# Patient Record
Sex: MALE | URBAN - METROPOLITAN AREA
[De-identification: names, ages, dates, MRNs, and addresses within clinical notes are randomized per-mention and may not be internally consistent; named-entity substitution may affect disease eponyms.]

---

## 2017-04-05 ENCOUNTER — OUTPATIENT (OUTPATIENT)
Dept: OUTPATIENT SERVICES | Facility: HOSPITAL | Age: 27
LOS: 1 days | Discharge: HOME | End: 2017-04-05

## 2017-04-05 DIAGNOSIS — S86.001A UNSPECIFIED INJURY OF RIGHT ACHILLES TENDON, INITIAL ENCOUNTER: Chronic | ICD-10-CM

## 2017-04-10 DIAGNOSIS — S86.011A STRAIN OF RIGHT ACHILLES TENDON, INITIAL ENCOUNTER: ICD-10-CM
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
## 2017-05-09 ENCOUNTER — INPATIENT (INPATIENT)
Facility: HOSPITAL | Age: 27
LOS: 3 days | Discharge: HOME | DRG: 857 | End: 2017-05-13
Attending: ORTHOPAEDIC SURGERY | Admitting: ORTHOPAEDIC SURGERY
Payer: COMMERCIAL

## 2017-05-09 VITALS
RESPIRATION RATE: 16 BRPM | HEIGHT: 68 IN | DIASTOLIC BLOOD PRESSURE: 74 MMHG | TEMPERATURE: 98 F | WEIGHT: 169.98 LBS | OXYGEN SATURATION: 97 % | HEART RATE: 76 BPM | SYSTOLIC BLOOD PRESSURE: 124 MMHG

## 2017-05-09 DIAGNOSIS — T81.30XA DISRUPTION OF WOUND, UNSPECIFIED, INITIAL ENCOUNTER: ICD-10-CM

## 2017-05-09 DIAGNOSIS — S86.001A UNSPECIFIED INJURY OF RIGHT ACHILLES TENDON, INITIAL ENCOUNTER: Chronic | ICD-10-CM

## 2017-05-09 LAB
ALBUMIN SERPL ELPH-MCNC: 3.8 G/DL — SIGNIFICANT CHANGE UP (ref 3.4–5)
ALP SERPL-CCNC: 101 U/L — SIGNIFICANT CHANGE UP (ref 40–120)
ALT FLD-CCNC: 36 U/L — SIGNIFICANT CHANGE UP (ref 12–42)
ANION GAP SERPL CALC-SCNC: 8 MMOL/L — LOW (ref 9–16)
APTT BLD: 32.9 SEC — SIGNIFICANT CHANGE UP (ref 27.5–37.4)
AST SERPL-CCNC: 34 U/L — SIGNIFICANT CHANGE UP (ref 15–37)
BASOPHILS NFR BLD AUTO: 0.5 % — SIGNIFICANT CHANGE UP (ref 0–2)
BILIRUB SERPL-MCNC: 0.7 MG/DL — SIGNIFICANT CHANGE UP (ref 0.2–1.2)
BLD GP AB SCN SERPL QL: NEGATIVE — SIGNIFICANT CHANGE UP
BUN SERPL-MCNC: 13 MG/DL — SIGNIFICANT CHANGE UP (ref 7–23)
CALCIUM SERPL-MCNC: 8.6 MG/DL — SIGNIFICANT CHANGE UP (ref 8.5–10.5)
CHLORIDE SERPL-SCNC: 103 MMOL/L — SIGNIFICANT CHANGE UP (ref 96–108)
CO2 SERPL-SCNC: 28 MMOL/L — SIGNIFICANT CHANGE UP (ref 22–31)
CREAT SERPL-MCNC: 1.23 MG/DL — SIGNIFICANT CHANGE UP (ref 0.5–1.3)
EOSINOPHIL NFR BLD AUTO: 1.8 % — SIGNIFICANT CHANGE UP (ref 0–6)
GLUCOSE SERPL-MCNC: 85 MG/DL — SIGNIFICANT CHANGE UP (ref 70–99)
HCT VFR BLD CALC: 44.9 % — SIGNIFICANT CHANGE UP (ref 39–50)
HGB BLD-MCNC: 16.2 G/DL — SIGNIFICANT CHANGE UP (ref 13–17)
INR BLD: 1.06 — SIGNIFICANT CHANGE UP (ref 0.88–1.16)
LYMPHOCYTES # BLD AUTO: 32.2 % — SIGNIFICANT CHANGE UP (ref 13–44)
MCHC RBC-ENTMCNC: 29.3 PG — SIGNIFICANT CHANGE UP (ref 27–34)
MCHC RBC-ENTMCNC: 36.1 G/DL — HIGH (ref 32–36)
MCV RBC AUTO: 81.3 FL — SIGNIFICANT CHANGE UP (ref 80–100)
MONOCYTES NFR BLD AUTO: 10.6 % — SIGNIFICANT CHANGE UP (ref 2–14)
NEUTROPHILS NFR BLD AUTO: 54.9 % — SIGNIFICANT CHANGE UP (ref 43–77)
PLATELET # BLD AUTO: 156 K/UL — SIGNIFICANT CHANGE UP (ref 150–400)
POTASSIUM SERPL-MCNC: 4.1 MMOL/L — SIGNIFICANT CHANGE UP (ref 3.5–5.3)
POTASSIUM SERPL-SCNC: 4.1 MMOL/L — SIGNIFICANT CHANGE UP (ref 3.5–5.3)
PROT SERPL-MCNC: 7.6 G/DL — SIGNIFICANT CHANGE UP (ref 6.4–8.2)
PROTHROM AB SERPL-ACNC: 11.8 SEC — SIGNIFICANT CHANGE UP (ref 9.8–12.7)
RBC # BLD: 5.52 M/UL — SIGNIFICANT CHANGE UP (ref 4.2–5.8)
RBC # FLD: 12.8 % — SIGNIFICANT CHANGE UP (ref 10.3–16.9)
RH IG SCN BLD-IMP: POSITIVE — SIGNIFICANT CHANGE UP
SODIUM SERPL-SCNC: 139 MMOL/L — SIGNIFICANT CHANGE UP (ref 135–145)
WBC # BLD: 4.4 K/UL — SIGNIFICANT CHANGE UP (ref 3.8–10.5)
WBC # FLD AUTO: 4.4 K/UL — SIGNIFICANT CHANGE UP (ref 3.8–10.5)

## 2017-05-09 PROCEDURE — 99285 EMERGENCY DEPT VISIT HI MDM: CPT

## 2017-05-09 RX ORDER — ACETAMINOPHEN 500 MG
650 TABLET ORAL EVERY 6 HOURS
Qty: 0 | Refills: 0 | Status: DISCONTINUED | OUTPATIENT
Start: 2017-05-09 | End: 2017-05-13

## 2017-05-09 RX ORDER — SODIUM CHLORIDE 9 MG/ML
1000 INJECTION, SOLUTION INTRAVENOUS
Qty: 0 | Refills: 0 | Status: DISCONTINUED | OUTPATIENT
Start: 2017-05-09 | End: 2017-05-10

## 2017-05-09 RX ORDER — METOCLOPRAMIDE HCL 10 MG
10 TABLET ORAL EVERY 6 HOURS
Qty: 0 | Refills: 0 | Status: DISCONTINUED | OUTPATIENT
Start: 2017-05-09 | End: 2017-05-13

## 2017-05-09 RX ORDER — OXYCODONE HYDROCHLORIDE 5 MG/1
5 TABLET ORAL EVERY 4 HOURS
Qty: 0 | Refills: 0 | Status: DISCONTINUED | OUTPATIENT
Start: 2017-05-09 | End: 2017-05-13

## 2017-05-09 RX ORDER — OXYCODONE HYDROCHLORIDE 5 MG/1
10 TABLET ORAL EVERY 4 HOURS
Qty: 0 | Refills: 0 | Status: DISCONTINUED | OUTPATIENT
Start: 2017-05-09 | End: 2017-05-13

## 2017-05-09 RX ORDER — DOCUSATE SODIUM 100 MG
100 CAPSULE ORAL THREE TIMES A DAY
Qty: 0 | Refills: 0 | Status: DISCONTINUED | OUTPATIENT
Start: 2017-05-09 | End: 2017-05-10

## 2017-05-09 RX ADMIN — SODIUM CHLORIDE 120 MILLILITER(S): 9 INJECTION, SOLUTION INTRAVENOUS at 19:08

## 2017-05-09 RX ADMIN — Medication 100 MILLIGRAM(S): at 22:35

## 2017-05-09 NOTE — PROGRESS NOTE ADULT - SUBJECTIVE AND OBJECTIVE BOX
Ortho Preop Note    Patient is a 27y old  Male who presents with a chief complaint of Right achilles wound dehiscence    Diagnosis: Right achilles wound dehiscence  Procedure: Right ankle I&D  Surgeon: Dr. Kumar                          16.2   4.4   )-----------( 156      ( 09 May 2017 13:43 )             44.9     05-09    139  |  103  |  13  ----------------------------<  85  4.1   |  28  |  1.23    Ca    8.6      09 May 2017 13:43    TPro  7.6  /  Alb  3.8  /  TBili  0.7  /  DBili  x   /  AST  34  /  ALT  36  /  AlkPhos  101  05-09    PT/INR - ( 09 May 2017 13:43 )   PT: 11.8 sec;   INR: 1.06          PTT - ( 09 May 2017 13:43 )  PTT:32.9 sec      [ ] Type & Screen  [x] CBC  [x] BMP  [x] PT/PTT/INR  [x] NPO/IVF  [x] Consent  [x] Added on to OR Schedule     Assessment & Plan:  27yMale with Right achilles wound dehiscence  -For OR today    Ortho Pager [phone]

## 2017-05-09 NOTE — ED ADULT NURSE NOTE - OBJECTIVE STATEMENT
Pt c/o pain and open wound to the posterior of the R heel. Status post "I had surgery to my Achilles tendon. I hurt it playing soccer." Notable open malodorous wound to the R posterior of the ankle above the heel. As per patient "they change dressing top weeks ago. I changes it on my own once." skin is temperature equal too the surrounding area. Pedal pulses intact. Pt denies numbness, tingling, tenderness, fever or chills. Notable darkened skin pigmentation around to the wound.

## 2017-05-09 NOTE — H&P ADULT - NSHPPHYSICALEXAM_GEN_ALL_CORE
Right LE: +wound dehiscence distal 2cm aspect of achilles incision, +swelling, +discoloration, no drainage noted but spotted blood on dressing  sensation intact  DP 2+  EHL/FHL/TA/GS 5/5

## 2017-05-09 NOTE — ED ADULT TRIAGE NOTE - CHIEF COMPLAINT QUOTE
Patient had achilles tendon sx on april 5, sent from [facility] for infection to surgical site.  Foul smell noted to site with yellow discharge, denies fevers.

## 2017-05-09 NOTE — ED PROVIDER NOTE - MEDICAL DECISION MAKING DETAILS
pt to go to OR today for wash out will hold ABs to allow for clean culture result pt in no pain now will admit

## 2017-05-09 NOTE — ED PROVIDER NOTE - ATTENDING CONTRIBUTION TO CARE
27 M p/w post op achilles repair wound infection from early May with discharge and foul smell noted x 3 days.  VSS.  Noted wound infection.  Discussed with ortho who will admit.  Plan basic labs and IV abx.

## 2017-05-09 NOTE — H&P ADULT - HISTORY OF PRESENT ILLNESS
27M s/p Right achilles tendon repair 4/5/17 with uncomplicated postop course, was doing PT. c/o 3 day Hx of malodorous drainage from distal aspect of incision, with some pus noted. Denies numbness, tingling, fevers, chills. Ambulating with cam boot. Has not taken any antibiotics. Last food/drink yogurt @ 8am.

## 2017-05-09 NOTE — ED ADULT NURSE NOTE - CHIEF COMPLAINT QUOTE
Patient had achilles tendon sx on april 5, sent from [facility] for infection to surgical site.  Foul smell noted to site with yellow discharge, denies fevers.

## 2017-05-09 NOTE — ED PROVIDER NOTE - OBJECTIVE STATEMENT
26 yo male with recent right Achilles tendon rupture repair on 4/5/17 by Dr Kumar with CC 3-4 days malodorous, yellow discharge to surgical site.  Went to Dr. Kumar's clinic today who sent him to ER to eval for infection. Denies fever, chills, HA, CP, palpitations, SOB, nausea, vomiting, abdominal pain, tenderness, change in sensation, paresthesias, erythema, swelling, joint stiffness, decreased ROM, muscle weakness

## 2017-05-10 RX ORDER — HYDROMORPHONE HYDROCHLORIDE 2 MG/ML
0.5 INJECTION INTRAMUSCULAR; INTRAVENOUS; SUBCUTANEOUS EVERY 6 HOURS
Qty: 0 | Refills: 0 | Status: DISCONTINUED | OUTPATIENT
Start: 2017-05-10 | End: 2017-05-13

## 2017-05-10 RX ORDER — CEFEPIME 1 G/1
2000 INJECTION, POWDER, FOR SOLUTION INTRAMUSCULAR; INTRAVENOUS EVERY 12 HOURS
Qty: 0 | Refills: 0 | Status: DISCONTINUED | OUTPATIENT
Start: 2017-05-10 | End: 2017-05-12

## 2017-05-10 RX ORDER — KETOROLAC TROMETHAMINE 30 MG/ML
15 SYRINGE (ML) INJECTION ONCE
Qty: 0 | Refills: 0 | Status: DISCONTINUED | OUTPATIENT
Start: 2017-05-10 | End: 2017-05-10

## 2017-05-10 RX ORDER — VANCOMYCIN HCL 1 G
1000 VIAL (EA) INTRAVENOUS EVERY 12 HOURS
Qty: 0 | Refills: 0 | Status: DISCONTINUED | OUTPATIENT
Start: 2017-05-10 | End: 2017-05-12

## 2017-05-10 RX ORDER — SODIUM CHLORIDE 9 MG/ML
1000 INJECTION, SOLUTION INTRAVENOUS
Qty: 0 | Refills: 0 | Status: DISCONTINUED | OUTPATIENT
Start: 2017-05-10 | End: 2017-05-13

## 2017-05-10 RX ORDER — HYDROMORPHONE HYDROCHLORIDE 2 MG/ML
1 INJECTION INTRAMUSCULAR; INTRAVENOUS; SUBCUTANEOUS ONCE
Qty: 0 | Refills: 0 | Status: DISCONTINUED | OUTPATIENT
Start: 2017-05-10 | End: 2017-05-10

## 2017-05-10 RX ORDER — CEFEPIME 1 G/1
2000 INJECTION, POWDER, FOR SOLUTION INTRAMUSCULAR; INTRAVENOUS EVERY 12 HOURS
Qty: 0 | Refills: 0 | Status: DISCONTINUED | OUTPATIENT
Start: 2017-05-10 | End: 2017-05-10

## 2017-05-10 RX ORDER — DOCUSATE SODIUM 100 MG
100 CAPSULE ORAL THREE TIMES A DAY
Qty: 0 | Refills: 0 | Status: DISCONTINUED | OUTPATIENT
Start: 2017-05-10 | End: 2017-05-13

## 2017-05-10 RX ORDER — OXYCODONE HYDROCHLORIDE 5 MG/1
5 TABLET ORAL EVERY 4 HOURS
Qty: 0 | Refills: 0 | Status: DISCONTINUED | OUTPATIENT
Start: 2017-05-10 | End: 2017-05-10

## 2017-05-10 RX ORDER — HEPARIN SODIUM 5000 [USP'U]/ML
5000 INJECTION INTRAVENOUS; SUBCUTANEOUS EVERY 8 HOURS
Qty: 0 | Refills: 0 | Status: DISCONTINUED | OUTPATIENT
Start: 2017-05-10 | End: 2017-05-13

## 2017-05-10 RX ORDER — HYDROMORPHONE HYDROCHLORIDE 2 MG/ML
0.5 INJECTION INTRAMUSCULAR; INTRAVENOUS; SUBCUTANEOUS
Qty: 0 | Refills: 0 | Status: DISCONTINUED | OUTPATIENT
Start: 2017-05-10 | End: 2017-05-10

## 2017-05-10 RX ORDER — MORPHINE SULFATE 50 MG/1
2 CAPSULE, EXTENDED RELEASE ORAL EVERY 4 HOURS
Qty: 0 | Refills: 0 | Status: DISCONTINUED | OUTPATIENT
Start: 2017-05-10 | End: 2017-05-10

## 2017-05-10 RX ADMIN — Medication 10 MILLIGRAM(S): at 22:11

## 2017-05-10 RX ADMIN — OXYCODONE HYDROCHLORIDE 10 MILLIGRAM(S): 5 TABLET ORAL at 10:20

## 2017-05-10 RX ADMIN — Medication 15 MILLIGRAM(S): at 20:46

## 2017-05-10 RX ADMIN — Medication 100 MILLIGRAM(S): at 22:11

## 2017-05-10 RX ADMIN — HYDROMORPHONE HYDROCHLORIDE 1 MILLIGRAM(S): 2 INJECTION INTRAMUSCULAR; INTRAVENOUS; SUBCUTANEOUS at 21:05

## 2017-05-10 RX ADMIN — OXYCODONE HYDROCHLORIDE 10 MILLIGRAM(S): 5 TABLET ORAL at 09:51

## 2017-05-10 RX ADMIN — HYDROMORPHONE HYDROCHLORIDE 0.5 MILLIGRAM(S): 2 INJECTION INTRAMUSCULAR; INTRAVENOUS; SUBCUTANEOUS at 20:37

## 2017-05-10 RX ADMIN — HYDROMORPHONE HYDROCHLORIDE 0.5 MILLIGRAM(S): 2 INJECTION INTRAMUSCULAR; INTRAVENOUS; SUBCUTANEOUS at 20:25

## 2017-05-10 RX ADMIN — HEPARIN SODIUM 5000 UNIT(S): 5000 INJECTION INTRAVENOUS; SUBCUTANEOUS at 22:11

## 2017-05-10 NOTE — CONSULT NOTE ADULT - ASSESSMENT
27M s/p Right achilles tendon repair 4/5/17 with uncomplicated postop course, admitted after 3 day Hx of malodorous drainage from distal aspect of incision for debridement

## 2017-05-10 NOTE — DISCHARGE NOTE ADULT - PATIENT PORTAL LINK FT
“You can access the [employer] Patient Portal, offered by [facility], by registering with the following website: [email or web address]”

## 2017-05-10 NOTE — CONSULT NOTE ADULT - PROBLEM SELECTOR RECOMMENDATION 9
1) Send OR wound culture, ESR, CRP  2) After surgery start [id number] IV q12h and Cefepime 2gr IV  12h, pending culture

## 2017-05-10 NOTE — DISCHARGE NOTE ADULT - ADDITIONAL INSTRUCTIONS
No strenuous activity, heavy lifting, driving or returning to work until cleared by MD.  You may shower - keep splint completely dry.  Do not remove splint until you follow up with Dr. Kumar  Try to have regular bowel movements, take stool softener or laxative if necessary.  May take pepcid or zantac for upset stomach.  Follow up with Dr. Kumar to schedule an appt, if you have staples or sutures they will be removed in office.  Contact your doctor if you experience: fever greater than 101.5, chills, chest pain, difficulty breathing, redness or excessive drainage around the incision, other concerns.  Follow up with Dr. Bonner in a week for Infectious Disease follow-up.  Cipro/Flagyl as prescribed for 4 weeks. Probiotics for 6 weeks. Tylenol/Advil for Pain. No strenuous activity, heavy lifting, driving or returning to work until cleared by MD.  You may shower - keep splint completely dry.  Do not remove splint until you follow up with Dr. Kumar  NWB RLE with Crutches  Try to have regular bowel movements, take stool softener or laxative if necessary.  May take pepcid or zantac for upset stomach.  Follow up with Dr. Kumar to schedule an appt, if you have staples or sutures they will be removed in office.  Contact your doctor if you experience: fever greater than 101.5, chills, chest pain, difficulty breathing, redness or excessive drainage around the incision, other concerns.  Follow up with Dr. Bonner in a week for Infectious Disease follow-up.  Cipro/Flagyl as prescribed for 4 weeks. Probiotics for 6 weeks. Tylenol/Advil for Pain.

## 2017-05-10 NOTE — DISCHARGE NOTE ADULT - MEDICATION SUMMARY - MEDICATIONS TO TAKE
I will START or STAY ON the medications listed below when I get home from the hospital:    Flagyl 500 mg oral tablet  -- 1 tab(s) by mouth every 8 hours MDD:3  -- Do not drink alcoholic beverages when taking this medication.  Finish all this medication unless otherwise directed by prescriber.  May discolor urine or feces.    -- Indication: For WOUND Infection    Florastor 250 mg oral capsule  -- 1 cap(s) by mouth 2 times a day MDD:2  -- Indication: For GI PPX    Cipro 500 mg oral tablet  -- 1 tab(s) by mouth every 12 hours MDD:2  -- Avoid prolonged or excessive exposure to direct and/or artificial sunlight while taking this medication.  Check with your doctor before becoming pregnant.  Do not take dairy products, antacids, or iron preparations within one hour of this medication.  Finish all this medication unless otherwise directed by prescriber.  Medication should be taken with plenty of water.    -- Indication: For WOUND Infection

## 2017-05-10 NOTE — DISCHARGE NOTE ADULT - HOSPITAL COURSE
Admitted  Surgery -  Perioperative abx  Pain control  DVT ppx  PT consult  ID consult Admitted  Surgery - R achilles I&D  Perioperative abx   Pain control  DVT ppx  PT consult  ID consult

## 2017-05-10 NOTE — DISCHARGE NOTE ADULT - CARE PLAN
Principal Discharge DX:	Wound dehiscence  Goal:	improvement after surgery  Instructions for follow-up, activity and diet:	see below

## 2017-05-10 NOTE — BRIEF OPERATIVE NOTE - PROCEDURE
Irrigation and debridement of ankle  05/10/2017  R Achilles tendon wound dehiscence  [facility]

## 2017-05-10 NOTE — CONSULT NOTE ADULT - SUBJECTIVE AND OBJECTIVE BOX
HPI:  27M s/p Right achilles tendon repair 4/5/17 with uncomplicated postop course, was doing PT. c/o 3 day Hx of malodorous drainage from distal aspect of incision, with some pus noted. Denies numbness, tingling, fevers, chills. Ambulating with cam boot. Has not taken any antibiotics. Last food/drink yogurt @ 8am. (09 May 2017 15:04)      PAST MEDICAL & SURGICAL HISTORY:  No pertinent past medical history  Injury of Achilles tendon, right, initial encounter: Right achilles tendon repair        REVIEW OF SYSTEMS:    General: no weakness; no fevers, no chills  Skin/Breast: no rash  Respiratory and Thorax: no SOB, no cough  Cardiovascular:	No chest pain  Gastrointestinal:	 no nausea, vomiting , diarrhea  Genitourinary:	no dysuria, no difficulty urinating, no hematuria  Musculoskeletal: R heel wound discharge  Neurological:	no focal weakness/numbness  Endocrine:	no polyuria, no polydipsia      ANTIBIOTICS:  MEDICATIONS  (STANDING):  lactated ringers. 1000milliLiter(s) IV Continuous <Continuous>  docusate sodium 100milliGRAM(s) Oral three times a day    MEDICATIONS  (PRN):  acetaminophen   Tablet 650milliGRAM(s) Oral every 6 hours PRN For Temp greater than 38 C (100.4 F)  acetaminophen   Tablet. 650milliGRAM(s) Oral every 6 hours PRN Mild Pain (1 - 3)  oxyCODONE IR 10milliGRAM(s) Oral every 4 hours PRN Severe Pain (7 - 10)  oxyCODONE IR 5milliGRAM(s) Oral every 4 hours PRN Moderate Pain (4 - 6)  metoclopramide Injectable 10milliGRAM(s) IV Push every 6 hours PRN Nausea and/or Vomiting      Allergies: No Known Allergies        SOCIAL HISTORY:No smoking, no ETOH    FAMILY HISTORY:      Vital Signs Last 24 Hrs  T(C): 35.7, Max: 36.8 (05-09 @ 14:02)  T(F): 96.2, Max: 98.2 (05-09 @ 14:02)  HR: 78 (62 - 80)  BP: 125/73 (95/60 - 126/85)  RR: 16 (16 - 17)  SpO2: 100% (97% - 100%)    PHYSICAL EXAM:  Constitutional:Well-developed, well nourished  Eyes:SOLITARIO, EOMI  Ear/Nose/Throat: no oral lesion, no sinus tenderness on percussion	  Neck:no JVD, no lymphadenopathy, supple  Respiratory: CTA luigi  Cardiovascular: S1S2 RRR, no murmurs  Gastrointestinal:soft, (+) BS, no HSM  Extremities:Right LE: +wound dehiscence distal 2cm aspect of achilles incision, +swelling, +discoloration, no drainage noted but spotted blood on dressing  sensation intact  DP 2+  Vascular: DP Pulse:right normal; left normal            LABS:                        16.2   4.4   )-----------( 156      ( 09 May 2017 13:43 )             44.9     05-09    139  |  103  |  13  ----------------------------<  85  4.1   |  28  |  1.23    Ca    8.6      09 May 2017 13:43    TPro  7.6  /  Alb  3.8  /  TBili  0.7  /  DBili  x   /  AST  34  /  ALT  36  /  AlkPhos  101  05-09    PT/INR - ( 09 May 2017 13:43 )   PT: 11.8 sec;   INR: 1.06          PTT - ( 09 May 2017 13:43 )  PTT:32.9 sec      MICROBIOLOGY:  Culture - Blood (05.09.17 @ 15:19)    Specimen Source: .Blood Blood-Venous    Culture Results:   No growth at 12 hours      RADIOLOGY & ADDITIONAL STUDIES:

## 2017-05-10 NOTE — PROGRESS NOTE ADULT - SUBJECTIVE AND OBJECTIVE BOX
Orthopedics Post Op Check    Procedure: Right achilles wound dehiscience  Surgeon: Stacy FERGUSON: Patient seen and examined. Pain reported but controlled. No acute concerns.  Denies CP, SOB, N/V, numbness/tingling      O:   Vital Signs Last 24 Hrs  T(C): 35.9, Max: 37.2 (05-10 @ 12:55)  T(F): 96.7, Max: 98.9 (05-10 @ 12:55)  HR: 84 (62 - 107)  BP: 117/60 (95/60 - 127/66)  BP(mean): --  RR: 16 (12 - 17)  SpO2: 100% (97% - 100%)  Motor: can fire toes in splint, right leg  SILT to patients baseline BL  WWP DP PT BL  Dressing C/D/I    A/P: s/p above  - Stable  - Pain Control  - DVT ppx: HSQ  - Post op abx: Cefepime + Vancomycin  - PT/WBAT  - F/U Labs

## 2017-05-10 NOTE — DISCHARGE NOTE ADULT - CARE PROVIDER_API CALL
Maxime Kumar), Orthopaedic Surgery  [address] [place] [address]  Phone: [phone]  Fax: [phone] Maxime Kumar), Orthopaedic Surgery  [address] [place] [address]  Phone: [phone]  Fax: [phone]    Blanche Bonner), Infectious Disease; Internal Medicine  [address] [place] [address]  Phone: [phone]  Fax: [phone]

## 2017-05-11 LAB
ANION GAP SERPL CALC-SCNC: 8 MMOL/L — LOW (ref 9–16)
BUN SERPL-MCNC: 16 MG/DL — SIGNIFICANT CHANGE UP (ref 7–23)
CALCIUM SERPL-MCNC: 8.2 MG/DL — LOW (ref 8.5–10.5)
CHLORIDE SERPL-SCNC: 100 MMOL/L — SIGNIFICANT CHANGE UP (ref 96–108)
CO2 SERPL-SCNC: 25 MMOL/L — SIGNIFICANT CHANGE UP (ref 22–31)
CREAT SERPL-MCNC: 1.24 MG/DL — SIGNIFICANT CHANGE UP (ref 0.5–1.3)
CRP SERPL-MCNC: 0.41 MG/DL — HIGH
ERYTHROCYTE [SEDIMENTATION RATE] IN BLOOD: 2 MM/HR — SIGNIFICANT CHANGE UP
GLUCOSE SERPL-MCNC: 154 MG/DL — HIGH (ref 70–99)
GRAM STN FLD: SIGNIFICANT CHANGE UP
GRAM STN FLD: SIGNIFICANT CHANGE UP
HCT VFR BLD CALC: 43.5 % — SIGNIFICANT CHANGE UP (ref 39–50)
HGB BLD-MCNC: 15.8 G/DL — SIGNIFICANT CHANGE UP (ref 13–17)
INR BLD: 1.1 — SIGNIFICANT CHANGE UP (ref 0.88–1.16)
MCHC RBC-ENTMCNC: 28.5 PG — SIGNIFICANT CHANGE UP (ref 27–34)
MCHC RBC-ENTMCNC: 36.3 G/DL — HIGH (ref 32–36)
MCV RBC AUTO: 78.5 FL — LOW (ref 80–100)
PLATELET # BLD AUTO: 174 K/UL — SIGNIFICANT CHANGE UP (ref 150–400)
POTASSIUM SERPL-MCNC: 4.4 MMOL/L — SIGNIFICANT CHANGE UP (ref 3.5–5.3)
POTASSIUM SERPL-SCNC: 4.4 MMOL/L — SIGNIFICANT CHANGE UP (ref 3.5–5.3)
PROTHROM AB SERPL-ACNC: 12.2 SEC — SIGNIFICANT CHANGE UP (ref 9.8–12.7)
RBC # BLD: 5.54 M/UL — SIGNIFICANT CHANGE UP (ref 4.2–5.8)
RBC # FLD: 11.9 % — SIGNIFICANT CHANGE UP (ref 10.3–16.9)
SODIUM SERPL-SCNC: 133 MMOL/L — LOW (ref 135–145)
SPECIMEN SOURCE: SIGNIFICANT CHANGE UP
SPECIMEN SOURCE: SIGNIFICANT CHANGE UP
WBC # BLD: 7.5 K/UL — SIGNIFICANT CHANGE UP (ref 3.8–10.5)
WBC # FLD AUTO: 7.5 K/UL — SIGNIFICANT CHANGE UP (ref 3.8–10.5)

## 2017-05-11 RX ORDER — SENNA PLUS 8.6 MG/1
2 TABLET ORAL AT BEDTIME
Qty: 0 | Refills: 0 | Status: DISCONTINUED | OUTPATIENT
Start: 2017-05-11 | End: 2017-05-12

## 2017-05-11 RX ORDER — OXYCODONE HYDROCHLORIDE 5 MG/1
5 TABLET ORAL ONCE
Qty: 0 | Refills: 0 | Status: DISCONTINUED | OUTPATIENT
Start: 2017-05-11 | End: 2017-05-11

## 2017-05-11 RX ORDER — SENNA PLUS 8.6 MG/1
2 TABLET ORAL AT BEDTIME
Qty: 0 | Refills: 0 | Status: DISCONTINUED | OUTPATIENT
Start: 2017-05-11 | End: 2017-05-13

## 2017-05-11 RX ADMIN — Medication 250 MILLIGRAM(S): at 05:46

## 2017-05-11 RX ADMIN — CEFEPIME 100 MILLIGRAM(S): 1 INJECTION, POWDER, FOR SOLUTION INTRAMUSCULAR; INTRAVENOUS at 23:33

## 2017-05-11 RX ADMIN — OXYCODONE HYDROCHLORIDE 10 MILLIGRAM(S): 5 TABLET ORAL at 18:37

## 2017-05-11 RX ADMIN — HEPARIN SODIUM 5000 UNIT(S): 5000 INJECTION INTRAVENOUS; SUBCUTANEOUS at 05:46

## 2017-05-11 RX ADMIN — OXYCODONE HYDROCHLORIDE 10 MILLIGRAM(S): 5 TABLET ORAL at 18:07

## 2017-05-11 RX ADMIN — Medication 5 MILLIGRAM(S): at 18:07

## 2017-05-11 RX ADMIN — OXYCODONE HYDROCHLORIDE 5 MILLIGRAM(S): 5 TABLET ORAL at 14:20

## 2017-05-11 RX ADMIN — CEFEPIME 100 MILLIGRAM(S): 1 INJECTION, POWDER, FOR SOLUTION INTRAMUSCULAR; INTRAVENOUS at 00:19

## 2017-05-11 RX ADMIN — OXYCODONE HYDROCHLORIDE 5 MILLIGRAM(S): 5 TABLET ORAL at 12:38

## 2017-05-11 RX ADMIN — Medication 100 MILLIGRAM(S): at 22:39

## 2017-05-11 RX ADMIN — Medication 100 MILLIGRAM(S): at 05:46

## 2017-05-11 RX ADMIN — CEFEPIME 100 MILLIGRAM(S): 1 INJECTION, POWDER, FOR SOLUTION INTRAMUSCULAR; INTRAVENOUS at 11:28

## 2017-05-11 RX ADMIN — Medication 100 MILLIGRAM(S): at 15:16

## 2017-05-11 RX ADMIN — OXYCODONE HYDROCHLORIDE 5 MILLIGRAM(S): 5 TABLET ORAL at 13:49

## 2017-05-11 RX ADMIN — HEPARIN SODIUM 5000 UNIT(S): 5000 INJECTION INTRAVENOUS; SUBCUTANEOUS at 22:39

## 2017-05-11 RX ADMIN — Medication 250 MILLIGRAM(S): at 18:07

## 2017-05-11 RX ADMIN — HEPARIN SODIUM 5000 UNIT(S): 5000 INJECTION INTRAVENOUS; SUBCUTANEOUS at 15:15

## 2017-05-11 RX ADMIN — OXYCODONE HYDROCHLORIDE 5 MILLIGRAM(S): 5 TABLET ORAL at 13:00

## 2017-05-11 RX ADMIN — SENNA PLUS 2 TABLET(S): 8.6 TABLET ORAL at 22:39

## 2017-05-11 NOTE — PHYSICAL THERAPY INITIAL EVALUATION ADULT - ACTIVE RANGE OF MOTION EXAMINATION, REHAB EVAL
Right UE Active ROM was WFL (within functional limits)/Left LE Active ROM was WFL (within functional limits)/right ankle in neutral position secondary to splint and ACE wrap/Left UE Active ROM was WFL (within functional limits)

## 2017-05-11 NOTE — PROGRESS NOTE ADULT - SUBJECTIVE AND OBJECTIVE BOX
S: Patient seen and examined. Pt. doing well, Pain endorsed but controlled. No acute events overnight.    AFVSS    Motor: Wiggling toes.   Sensation: SILT sural, saph, DP, SPN, tibial n distribution  Pulses: WWP, DP/PT 2+    Dressing: C/D/I. Short leg splint                          15.8   7.5   )-----------( 174      ( 11 May 2017 05:47 )             43.5             A/P:  27y Male s/p  I&D Achilles wound dehiscence      , POD#   1  -Stable  - Vanc, Cefepime per ID, follow up recs  - f/u labs and intra-op cxs  -Pain Control  -PT/NWB  -DVT ppx: SQH  -Advance diet as tolerated  -f/u AM labs  -Dispo: Home    Fran Hernandez MD  Ortho Resident

## 2017-05-11 NOTE — PHYSICAL THERAPY INITIAL EVALUATION ADULT - PERTINENT HX OF CURRENT PROBLEM, REHAB EVAL
27 year old male with achilles tendon repair 4/5/17. Presents with drainage from distal aspect of incision.

## 2017-05-11 NOTE — PHYSICAL THERAPY INITIAL EVALUATION ADULT - ADDITIONAL COMMENTS
Patient lives with his girlfriend in an apartment, 2 steps to enter. Patient reports utilizing crutches prior to and after achilles tendon repair. Owns axillary crutches

## 2017-05-11 NOTE — PHYSICAL THERAPY INITIAL EVALUATION ADULT - DISCHARGE DISPOSITION, PT EVAL
Patient discharged from acute PT services at this time as he demonstrates independence with all functional mobility./no skilled PT needs/home

## 2017-05-11 NOTE — PROGRESS NOTE ADULT - SUBJECTIVE AND OBJECTIVE BOX
ORTHO NOTE    [ ] Pt seen/examined.  [ ] Pt without any complaints/in NAD.    [ ] Pt complains of:      ROS: [ ] Fever  [ ] Chills  [ ] CP [ ] SOB [ ] Dysnea  [ ] Palpitations [ ] Cough [ ] N/V/C/D [ ] Paresthia [ ] Other     [ ] ROS  otherwise negative    .    PHYSICAL EXAM:    Vital Signs Last 24 Hrs  T(C): 36.7, Max: 37.2 (05-10 @ 12:55)  T(F): 98.1, Max: 98.9 (05-10 @ 12:55)  HR: 73 (62 - 107)  BP: 119/81 (103/63 - 127/66)  BP(mean): --  RR: 16 (12 - 20)  SpO2: 99% (97% - 100%)    I&O's Detail  I & Os for 24h ending 11 May 2017 07:00  =============================================  IN:    lactated ringers.: 800 ml    Total IN: 800 ml  ---------------------------------------------  OUT:    Voided: 550 ml    Estimated Blood Loss: 15 ml    Total OUT: 565 ml  ---------------------------------------------  Total NET: 235 ml    I & Os for current day (as of 11 May 2017 09:27)  =============================================  IN:    Total IN: 0 ml  ---------------------------------------------  OUT:    Voided: 400 ml    Total OUT: 400 ml  ---------------------------------------------  Total NET: -400 ml       CAPILLARY BLOOD GLUCOSE                  Neuro:    Lungs:    CV:    ABD:     Ext:    LABS                        15.8   7.5   )-----------( 174      ( 11 May 2017 05:47 )             43.5                              PT/INR - ( 11 May 2017 05:48 )   PT: 12.2 sec;   INR: 1.10          PTT - ( 09 May 2017 13:43 )  PTT:32.9 sec  05-11    133<L>  |  100  |  16  ----------------------------<  154<H>  4.4   |  25  |  1.24    Ca    8.2<L>      11 May 2017 05:49    TPro  7.6  /  Alb  3.8  /  TBili  0.7  /  DBili  x   /  AST  34  /  ALT  36  /  AlkPhos  101  05-09      [ ] Other Labs  [ ] None ordered            Please check or [place] when present:  •  Heart Failure:    [ ] Acute        [ ]  Acute on Chronic        [ ] Chronic         [ ] Diastolic     [ ]  Combined    •  ALAN:     [ ] ATN        [ ]  Renal medullary necrosis       [ ]  Renal cortical necrosis                  [ ] Other pathological Lesion:  •  CKD:  [ ] Stage I   [ ] Stage II  [ ] Stage III    [ ]Stage IV   [ ]  CKD V   [ ]  Other/Unspecified:    •  Abdominal Nutritional Status:   [ ] Malnutrition-See Nutrition note    [ ] Cachexia   [ ]  Other        [ ] Supplement ordered:            [ ] Morbid Obesity: BMI >=40         ASSESSMENT/PLAN:      STATUS POST:  R achilles tendon I and D pod 1    CONTINUE:          [ ] PT nwb RLE    [ ] DVT PPX- SQ heparin    [ ] Pain Mgt con't current regimen    [ ] Dispo plan- home    F/u OR cultures.  Blood cx ngtd.  Con't IV Vanco and cefepime for now, f/u vanco trough.  IS use.  Bowel regimen.  Monitor Na, hyponatremic 133. ORTHO NOTE    [X ] Pt seen/examined at 8:15 Am.  [ ] Pt without any complaints/in NAD.    [X ] Pt complains of:  incisional pain is controlled.      ROS: [ ] Fever  [ ] Chills  [ ] CP [ ] SOB [ ] Dysnea  [ ] Palpitations [ ] Cough [ ] N/V/C/D [ ] Paresthia [ ] Other     [X ] ROS  otherwise negative    .    PHYSICAL EXAM:    Vital Signs Last 24 Hrs  T(C): 36.7, Max: 37.2 (05-10 @ 12:55)  T(F): 98.1, Max: 98.9 (05-10 @ 12:55)  HR: 73 (62 - 107)  BP: 119/81 (103/63 - 127/66)  BP(mean): --  RR: 16 (12 - 20)  SpO2: 99% (97% - 100%)    I&O's Detail  I & Os for 24h ending 11 May 2017 07:00  =============================================  IN:    lactated ringers.: 800 ml    Total IN: 800 ml  ---------------------------------------------  OUT:    Voided: 550 ml    Estimated Blood Loss: 15 ml    Total OUT: 565 ml  ---------------------------------------------  Total NET: 235 ml    I & Os for current day (as of 11 May 2017 09:27)  =============================================  IN:    Total IN: 0 ml  ---------------------------------------------  OUT:    Voided: 400 ml    Total OUT: 400 ml  ---------------------------------------------  Total NET: -400 ml       CAPILLARY BLOOD GLUCOSE                  Neuro:  NAD A and O x 3    Lungs:    CV:    ABD:     Ext:  RLE splint c/d/i, wiggles toes, silt    LABS                        15.8   7.5   )-----------( 174      ( 11 May 2017 05:47 )             43.5                              PT/INR - ( 11 May 2017 05:48 )   PT: 12.2 sec;   INR: 1.10          PTT - ( 09 May 2017 13:43 )  PTT:32.9 sec  05-11    133<L>  |  100  |  16  ----------------------------<  154<H>  4.4   |  25  |  1.24    Ca    8.2<L>      11 May 2017 05:49    TPro  7.6  /  Alb  3.8  /  TBili  0.7  /  DBili  x   /  AST  34  /  ALT  36  /  AlkPhos  101  05-09      [ ] Other Labs  [ ] None ordered            Please check or [place] when present:  •  Heart Failure:    [ ] Acute        [ ]  Acute on Chronic        [ ] Chronic         [ ] Diastolic     [ ]  Combined    •  ALAN:     [ ] ATN        [ ]  Renal medullary necrosis       [ ]  Renal cortical necrosis                  [ ] Other pathological Lesion:  •  CKD:  [ ] Stage I   [ ] Stage II  [ ] Stage III    [ ]Stage IV   [ ]  CKD V   [ ]  Other/Unspecified:    •  Abdominal Nutritional Status:   [ ] Malnutrition-See Nutrition note    [ ] Cachexia   [ ]  Other        [ ] Supplement ordered:            [ ] Morbid Obesity: BMI >=40         ASSESSMENT/PLAN:      STATUS POST:  R achilles tendon I and D pod 1    CONTINUE:          [ ] PT nwb RLE    [ ] DVT PPX- SQ heparin    [ ] Pain Mgt con't current regimen    [ ] Dispo plan- home    F/u OR cultures.  Blood cx ngtd.  Con't IV Vanco and cefepime for now, f/u vanco trough.  F/u ID recs.  IS use.  Bowel regimen.  Monitor Na, hyponatremic 133.  Cleared PT. ORTHO NOTE    [X ] Pt seen/examined at 8:15 Am.  [ ] Pt without any complaints/in NAD.    [X ] Pt complains of:  incisional pain is controlled.      ROS: [ ] Fever  [ ] Chills  [ ] CP [ ] SOB [ ] Dysnea  [ ] Palpitations [ ] Cough [ ] N/V/C/D [ ] Paresthia [ ] Other     [X ] ROS  otherwise negative    .    PHYSICAL EXAM:    Vital Signs Last 24 Hrs  T(C): 36.7, Max: 37.2 (05-10 @ 12:55)  T(F): 98.1, Max: 98.9 (05-10 @ 12:55)  HR: 73 (62 - 107)  BP: 119/81 (103/63 - 127/66)  BP(mean): --  RR: 16 (12 - 20)  SpO2: 99% (97% - 100%)    I&O's Detail  I & Os for 24h ending 11 May 2017 07:00  =============================================  IN:    lactated ringers.: 800 ml    Total IN: 800 ml  ---------------------------------------------  OUT:    Voided: 550 ml    Estimated Blood Loss: 15 ml    Total OUT: 565 ml  ---------------------------------------------  Total NET: 235 ml    I & Os for current day (as of 11 May 2017 09:27)  =============================================  IN:    Total IN: 0 ml  ---------------------------------------------  OUT:    Voided: 400 ml    Total OUT: 400 ml  ---------------------------------------------  Total NET: -400 ml       CAPILLARY BLOOD GLUCOSE                  Neuro:  NAD A and O x 3    Lungs:    CV:    ABD:     Ext:  RLE splint c/d/i, wiggles toes, silt    LABS                        15.8   7.5   )-----------( 174      ( 11 May 2017 05:47 )             43.5                              PT/INR - ( 11 May 2017 05:48 )   PT: 12.2 sec;   INR: 1.10          PTT - ( 09 May 2017 13:43 )  PTT:32.9 sec  05-11    133<L>  |  100  |  16  ----------------------------<  154<H>  4.4   |  25  |  1.24    Ca    8.2<L>      11 May 2017 05:49    TPro  7.6  /  Alb  3.8  /  TBili  0.7  /  DBili  x   /  AST  34  /  ALT  36  /  AlkPhos  101  05-09      [ ] Other Labs  [ ] None ordered            Please check or [place] when present:  •  Heart Failure:    [ ] Acute        [ ]  Acute on Chronic        [ ] Chronic         [ ] Diastolic     [ ]  Combined    •  ALAN:     [ ] ATN        [ ]  Renal medullary necrosis       [ ]  Renal cortical necrosis                  [ ] Other pathological Lesion:  •  CKD:  [ ] Stage I   [ ] Stage II  [ ] Stage III    [ ]Stage IV   [ ]  CKD V   [ ]  Other/Unspecified:    •  Abdominal Nutritional Status:   [ ] Malnutrition-See Nutrition note    [ ] Cachexia   [ ]  Other        [ ] Supplement ordered:            [ ] Morbid Obesity: BMI >=40         ASSESSMENT/PLAN:      STATUS POST:  R achilles tendon I and D pod 1    CONTINUE:          [ ] PT nwb RLE    [ ] DVT PPX- SQ heparin    [ ] Pain Mgt con't current regimen    [ ] Dispo plan- home    F/u OR cultures.  Blood cx ngtd.  Con't IV Vanco and cefepime for now, f/u vanco trough.  F/u ID recs.  IS use.  Bowel regimen.  Monitor Na, hyponatremic 133.  Cleared PT.  Dr Kumar told resident to check wound tomorrow and possibly place a prevena depending on how it looks.

## 2017-05-12 LAB
-  AMPICILLIN/SULBACTAM: SIGNIFICANT CHANGE UP
-  AMPICILLIN/SULBACTAM: SIGNIFICANT CHANGE UP
-  AMPICILLIN: SIGNIFICANT CHANGE UP
-  AMPICILLIN: SIGNIFICANT CHANGE UP
-  CEFAZOLIN: SIGNIFICANT CHANGE UP
-  CEFAZOLIN: SIGNIFICANT CHANGE UP
-  CEFTRIAXONE: SIGNIFICANT CHANGE UP
-  CEFTRIAXONE: SIGNIFICANT CHANGE UP
-  CIPROFLOXACIN: SIGNIFICANT CHANGE UP
-  CIPROFLOXACIN: SIGNIFICANT CHANGE UP
-  GENTAMICIN: SIGNIFICANT CHANGE UP
-  GENTAMICIN: SIGNIFICANT CHANGE UP
-  PIPERACILLIN/TAZOBACTAM: SIGNIFICANT CHANGE UP
-  PIPERACILLIN/TAZOBACTAM: SIGNIFICANT CHANGE UP
-  TOBRAMYCIN: SIGNIFICANT CHANGE UP
-  TOBRAMYCIN: SIGNIFICANT CHANGE UP
-  TRIMETHOPRIM/SULFAMETHOXAZOLE: SIGNIFICANT CHANGE UP
-  TRIMETHOPRIM/SULFAMETHOXAZOLE: SIGNIFICANT CHANGE UP
ANION GAP SERPL CALC-SCNC: 8 MMOL/L — LOW (ref 9–16)
BUN SERPL-MCNC: 14 MG/DL — SIGNIFICANT CHANGE UP (ref 7–23)
CALCIUM SERPL-MCNC: 8 MG/DL — LOW (ref 8.5–10.5)
CHLORIDE SERPL-SCNC: 104 MMOL/L — SIGNIFICANT CHANGE UP (ref 96–108)
CO2 SERPL-SCNC: 27 MMOL/L — SIGNIFICANT CHANGE UP (ref 22–31)
CREAT SERPL-MCNC: 1.36 MG/DL — HIGH (ref 0.5–1.3)
CULTURE RESULTS: SIGNIFICANT CHANGE UP
CULTURE RESULTS: SIGNIFICANT CHANGE UP
GLUCOSE SERPL-MCNC: 91 MG/DL — SIGNIFICANT CHANGE UP (ref 70–99)
HCT VFR BLD CALC: 39 % — SIGNIFICANT CHANGE UP (ref 39–50)
HGB BLD-MCNC: 13.6 G/DL — SIGNIFICANT CHANGE UP (ref 13–17)
INR BLD: 1.06 — SIGNIFICANT CHANGE UP (ref 0.88–1.16)
MCHC RBC-ENTMCNC: 27.9 PG — SIGNIFICANT CHANGE UP (ref 27–34)
MCHC RBC-ENTMCNC: 34.9 G/DL — SIGNIFICANT CHANGE UP (ref 32–36)
MCV RBC AUTO: 80.1 FL — SIGNIFICANT CHANGE UP (ref 80–100)
METHOD TYPE: SIGNIFICANT CHANGE UP
METHOD TYPE: SIGNIFICANT CHANGE UP
ORGANISM # SPEC MICROSCOPIC CNT: SIGNIFICANT CHANGE UP
PLATELET # BLD AUTO: 142 K/UL — LOW (ref 150–400)
POTASSIUM SERPL-MCNC: 3.9 MMOL/L — SIGNIFICANT CHANGE UP (ref 3.5–5.3)
POTASSIUM SERPL-SCNC: 3.9 MMOL/L — SIGNIFICANT CHANGE UP (ref 3.5–5.3)
PROTHROM AB SERPL-ACNC: 11.8 SEC — SIGNIFICANT CHANGE UP (ref 9.8–12.7)
RBC # BLD: 4.87 M/UL — SIGNIFICANT CHANGE UP (ref 4.2–5.8)
RBC # FLD: 12.6 % — SIGNIFICANT CHANGE UP (ref 10.3–16.9)
SODIUM SERPL-SCNC: 139 MMOL/L — SIGNIFICANT CHANGE UP (ref 135–145)
SPECIMEN SOURCE: SIGNIFICANT CHANGE UP
SPECIMEN SOURCE: SIGNIFICANT CHANGE UP
WBC # BLD: 7.3 K/UL — SIGNIFICANT CHANGE UP (ref 3.8–10.5)
WBC # FLD AUTO: 7.3 K/UL — SIGNIFICANT CHANGE UP (ref 3.8–10.5)

## 2017-05-12 RX ORDER — METRONIDAZOLE 500 MG
1 TABLET ORAL
Qty: 84 | Refills: 0 | OUTPATIENT
Start: 2017-05-12 | End: 2017-06-09

## 2017-05-12 RX ORDER — METRONIDAZOLE 500 MG
500 TABLET ORAL EVERY 8 HOURS
Qty: 0 | Refills: 0 | Status: DISCONTINUED | OUTPATIENT
Start: 2017-05-12 | End: 2017-05-13

## 2017-05-12 RX ORDER — MOXIFLOXACIN HYDROCHLORIDE TABLETS, 400 MG 400 MG/1
1 TABLET, FILM COATED ORAL
Qty: 56 | Refills: 0 | OUTPATIENT
Start: 2017-05-12 | End: 2017-06-09

## 2017-05-12 RX ORDER — CIPROFLOXACIN LACTATE 400MG/40ML
500 VIAL (ML) INTRAVENOUS EVERY 12 HOURS
Qty: 0 | Refills: 0 | Status: DISCONTINUED | OUTPATIENT
Start: 2017-05-12 | End: 2017-05-13

## 2017-05-12 RX ADMIN — Medication 100 MILLIGRAM(S): at 12:55

## 2017-05-12 RX ADMIN — Medication 250 MILLIGRAM(S): at 06:07

## 2017-05-12 RX ADMIN — Medication 100 MILLIGRAM(S): at 21:37

## 2017-05-12 RX ADMIN — Medication 500 MILLIGRAM(S): at 18:10

## 2017-05-12 RX ADMIN — CEFEPIME 100 MILLIGRAM(S): 1 INJECTION, POWDER, FOR SOLUTION INTRAMUSCULAR; INTRAVENOUS at 12:55

## 2017-05-12 RX ADMIN — Medication 100 MILLIGRAM(S): at 06:07

## 2017-05-12 RX ADMIN — HEPARIN SODIUM 5000 UNIT(S): 5000 INJECTION INTRAVENOUS; SUBCUTANEOUS at 12:55

## 2017-05-12 RX ADMIN — Medication 5 MILLIGRAM(S): at 06:07

## 2017-05-12 RX ADMIN — Medication 5 MILLIGRAM(S): at 18:10

## 2017-05-12 RX ADMIN — SENNA PLUS 2 TABLET(S): 8.6 TABLET ORAL at 21:37

## 2017-05-12 RX ADMIN — HEPARIN SODIUM 5000 UNIT(S): 5000 INJECTION INTRAVENOUS; SUBCUTANEOUS at 06:07

## 2017-05-12 RX ADMIN — Medication 500 MILLIGRAM(S): at 21:37

## 2017-05-12 RX ADMIN — HEPARIN SODIUM 5000 UNIT(S): 5000 INJECTION INTRAVENOUS; SUBCUTANEOUS at 21:38

## 2017-05-12 NOTE — PROGRESS NOTE ADULT - ATTENDING COMMENTS
Discussed with SANDIP Marroquin,Ortho residentm,patient and his father as well as chart review and exam

## 2017-05-12 NOTE — PROGRESS NOTE ADULT - ASSESSMENT
Uncomplicated post op course folllowing irrigation and debridement of ankle  05/10/2017  R Achilles tendon wound dehiscence  Active  infection of wound and tendon noted in OR.Little decrease in renal function as noted above possibly secondary to Vanco now D/C.Continue Cipro 500 mgm po q.12 h x 4 weeks along with Flagyl 500 mgm po q.8 h x 4 weeks.Add Florastar probiotic 250 mgm bid for 6 weeks.Dr Galicia will follow patient in her office.Suggest weekly CBC,CMP,CRP,ESR,and UA/Micro to monitor clinical course and effect of ABs.From ID standpoint could be discharged in am to outpatient care.

## 2017-05-12 NOTE — PROGRESS NOTE ADULT - SUBJECTIVE AND OBJECTIVE BOX
ORTHO NOTE    [X ] Pt seen/examined.  [ ] Pt without any complaints/in NAD.    [X ] Pt complains of:  Incisional pain controlled      ROS: [ ] Fever  [ ] Chills  [ ] CP [ ] SOB [ ] Dysnea  [ ] Palpitations [ ] Cough [ ] N/V/C/D [ ] Paresthia [ ] Other     [X ] ROS  otherwise negative    .    PHYSICAL EXAM:    Vital Signs Last 24 Hrs  T(C): 35.9, Max: 37.1 (05-11 @ 14:15)  T(F): 96.7, Max: 98.7 (05-11 @ 14:15)  HR: 75 (70 - 83)  BP: 105/68 (105/68 - 116/63)  BP(mean): --  RR: 18 (16 - 18)  SpO2: 98% (93% - 98%)    I&O's Detail  I & Os for 24h ending 12 May 2017 07:00  =============================================  IN:    Oral Fluid: 840 ml    lactated ringers.: 350 ml    IV PiggyBack: 300 ml    Total IN: 1490 ml  ---------------------------------------------  OUT:    Voided: 3100 ml    Total OUT: 3100 ml  ---------------------------------------------  Total NET: -1610 ml    I & Os for current day (as of 12 May 2017 11:58)  =============================================  IN:    Oral Fluid: 400 ml    Total IN: 400 ml  ---------------------------------------------  OUT:    Voided: 400 ml    Total OUT: 400 ml  ---------------------------------------------  Total NET: 0 ml       CAPILLARY BLOOD GLUCOSE                  Neuro:  NAD A and O x 3    Lungs:    CV:    ABD:     Ext:  R splint c/d/i silt wiggles toes    LABS                        13.6   7.3   )-----------( 142      ( 12 May 2017 06:52 )             39.0                              PT/INR - ( 12 May 2017 06:52 )   PT: 11.8 sec;   INR: 1.06            05-12    139  |  104  |  14  ----------------------------<  91  3.9   |  27  |  1.36<H>    Ca    8.0<L>      12 May 2017 06:50        [ ] Other Labs  [ ] None ordered            Please check or [place] when present:  •  Heart Failure:    [ ] Acute        [ ]  Acute on Chronic        [ ] Chronic         [ ] Diastolic     [ ]  Combined    •  ALAN:     [ ] ATN        [ ]  Renal medullary necrosis       [ ]  Renal cortical necrosis                  [ ] Other pathological Lesion:  •  CKD:  [ ] Stage I   [ ] Stage II  [ ] Stage III    [ ]Stage IV   [ ]  CKD V   [ ]  Other/Unspecified:    •  Abdominal Nutritional Status:   [ ] Malnutrition-See Nutrition note    [ ] Cachexia   [ ]  Other        [ ] Supplement ordered:            [ ] Morbid Obesity: BMI >=40         ASSESSMENT/PLAN:      STATUS POST: R achilles I and D pod 2    CONTINUE:          [ ] PT - NWB RLE - already cleared PT    [ ] DVT PPX- SQH    [ ] Pain Mgt con't current regimen    [ ] Dispo plan- home    OR culture shows E coli.  Currently on vanco and cefepime, f/u vanco trough tonight.  His wound swab done in office on 5/9 showed rare wbc, many gram neg rods, moderate gram + cocci.  Left message for Dr Sandeep Smiley who is covering for [employer].   Encourage increase po fluid intake since cr bumped slightly to 1.36.  Dr Kumar looked at incision this AM - no need for prevena at this time. ORTHO NOTE    [X ] Pt seen/examined.  [ ] Pt without any complaints/in NAD.    [X ] Pt complains of:  Incisional pain controlled      ROS: [ ] Fever  [ ] Chills  [ ] CP [ ] SOB [ ] Dysnea  [ ] Palpitations [ ] Cough [ ] N/V/C/D [ ] Paresthia [ ] Other     [X ] ROS  otherwise negative    .    PHYSICAL EXAM:    Vital Signs Last 24 Hrs  T(C): 35.9, Max: 37.1 (05-11 @ 14:15)  T(F): 96.7, Max: 98.7 (05-11 @ 14:15)  HR: 75 (70 - 83)  BP: 105/68 (105/68 - 116/63)  BP(mean): --  RR: 18 (16 - 18)  SpO2: 98% (93% - 98%)    I&O's Detail  I & Os for 24h ending 12 May 2017 07:00  =============================================  IN:    Oral Fluid: 840 ml    lactated ringers.: 350 ml    IV PiggyBack: 300 ml    Total IN: 1490 ml  ---------------------------------------------  OUT:    Voided: 3100 ml    Total OUT: 3100 ml  ---------------------------------------------  Total NET: -1610 ml    I & Os for current day (as of 12 May 2017 11:58)  =============================================  IN:    Oral Fluid: 400 ml    Total IN: 400 ml  ---------------------------------------------  OUT:    Voided: 400 ml    Total OUT: 400 ml  ---------------------------------------------  Total NET: 0 ml       CAPILLARY BLOOD GLUCOSE                  Neuro:  NAD A and O x 3    Lungs:    CV:    ABD:     Ext:  R splint c/d/i silt wiggles toes    LABS                        13.6   7.3   )-----------( 142      ( 12 May 2017 06:52 )             39.0                              PT/INR - ( 12 May 2017 06:52 )   PT: 11.8 sec;   INR: 1.06            05-12    139  |  104  |  14  ----------------------------<  91  3.9   |  27  |  1.36<H>    Ca    8.0<L>      12 May 2017 06:50        [ ] Other Labs  [ ] None ordered            Please check or [place] when present:  •  Heart Failure:    [ ] Acute        [ ]  Acute on Chronic        [ ] Chronic         [ ] Diastolic     [ ]  Combined    •  ALAN:     [ ] ATN        [ ]  Renal medullary necrosis       [ ]  Renal cortical necrosis                  [ ] Other pathological Lesion:  •  CKD:  [ ] Stage I   [ ] Stage II  [ ] Stage III    [ ]Stage IV   [ ]  CKD V   [ ]  Other/Unspecified:    •  Abdominal Nutritional Status:   [ ] Malnutrition-See Nutrition note    [ ] Cachexia   [ ]  Other        [ ] Supplement ordered:            [ ] Morbid Obesity: BMI >=40         ASSESSMENT/PLAN:      STATUS POST: R achilles I and D pod 2    CONTINUE:          [ ] PT - NWB RLE - already cleared PT    [ ] DVT PPX- SQH    [ ] Pain Mgt con't current regimen    [ ] Dispo plan- home    OR culture shows E coli.  Currently on vanco and cefepime, f/u vanco trough tonight.  His wound swab done in office on 5/9 showed rare wbc, many gram neg rods, moderate gram + cocci.  Left message for Dr Sandeep Smiley who is covering for [employer].   Encourage increase po fluid intake since cr bumped slightly to 1.36.  Dr Kumar looked at incision this AM - no need for prevena at this time.      1:30 PM - Spoke with Dr Smiley.  The gram + in the cx done in office is likely just contamination since the gram stain in OR shows no gram +.  OR cx sensitive to cipro.  Will d/c IV abx,  start cipro 500 mg q 12 hours.  Length of duration will depend on how deep the infection is.  I gave Dr Kumar's the ID docs numbers to speak to directly about this.  Dr Smiley will come by and see pt later. ORTHO NOTE    [X ] Pt seen/examined.  [ ] Pt without any complaints/in NAD.    [X ] Pt complains of:  Incisional pain controlled      ROS: [ ] Fever  [ ] Chills  [ ] CP [ ] SOB [ ] Dysnea  [ ] Palpitations [ ] Cough [ ] N/V/C/D [ ] Paresthia [ ] Other     [X ] ROS  otherwise negative    .    PHYSICAL EXAM:    Vital Signs Last 24 Hrs  T(C): 35.9, Max: 37.1 (05-11 @ 14:15)  T(F): 96.7, Max: 98.7 (05-11 @ 14:15)  HR: 75 (70 - 83)  BP: 105/68 (105/68 - 116/63)  BP(mean): --  RR: 18 (16 - 18)  SpO2: 98% (93% - 98%)    I&O's Detail  I & Os for 24h ending 12 May 2017 07:00  =============================================  IN:    Oral Fluid: 840 ml    lactated ringers.: 350 ml    IV PiggyBack: 300 ml    Total IN: 1490 ml  ---------------------------------------------  OUT:    Voided: 3100 ml    Total OUT: 3100 ml  ---------------------------------------------  Total NET: -1610 ml    I & Os for current day (as of 12 May 2017 11:58)  =============================================  IN:    Oral Fluid: 400 ml    Total IN: 400 ml  ---------------------------------------------  OUT:    Voided: 400 ml    Total OUT: 400 ml  ---------------------------------------------  Total NET: 0 ml       CAPILLARY BLOOD GLUCOSE                  Neuro:  NAD A and O x 3    Lungs:    CV:    ABD:     Ext:  R splint c/d/i silt wiggles toes    LABS                        13.6   7.3   )-----------( 142      ( 12 May 2017 06:52 )             39.0                              PT/INR - ( 12 May 2017 06:52 )   PT: 11.8 sec;   INR: 1.06            05-12    139  |  104  |  14  ----------------------------<  91  3.9   |  27  |  1.36<H>    Ca    8.0<L>      12 May 2017 06:50        [ ] Other Labs  [ ] None ordered            Please check or [place] when present:  •  Heart Failure:    [ ] Acute        [ ]  Acute on Chronic        [ ] Chronic         [ ] Diastolic     [ ]  Combined    •  ALAN:     [ ] ATN        [ ]  Renal medullary necrosis       [ ]  Renal cortical necrosis                  [ ] Other pathological Lesion:  •  CKD:  [ ] Stage I   [ ] Stage II  [ ] Stage III    [ ]Stage IV   [ ]  CKD V   [ ]  Other/Unspecified:    •  Abdominal Nutritional Status:   [ ] Malnutrition-See Nutrition note    [ ] Cachexia   [ ]  Other        [ ] Supplement ordered:            [ ] Morbid Obesity: BMI >=40         ASSESSMENT/PLAN:      STATUS POST: R achilles I and D pod 2    CONTINUE:          [ ] PT - NWB RLE - already cleared PT    [ ] DVT PPX- SQH    [ ] Pain Mgt con't current regimen    [ ] Dispo plan- home    OR culture shows E coli.  Currently on vanco and cefepime, f/u vanco trough tonight.  His wound swab done in office on 5/9 showed rare wbc, many gram neg rods, moderate gram + cocci.  Left message for Dr Sandeep Smiley who is covering for [employer].   Encourage increase po fluid intake since cr bumped slightly to 1.36.  Dr Kumar looked at incision this AM - no need for prevena at this time.      1:30 PM - Spoke with Dr Smiley.  The gram + in the cx done in office is likely just contamination since the gram stain in OR shows no gram +.  OR cx sensitive to cipro.  Will d/c IV abx,  start cipro 500 mg q 12 hours.  Length of duration will depend on how deep the infection is.  I gave Dr Kumar's the ID docs numbers to speak to directly about this.  Dr Smiley will come by and see pt later.    2:15 PM - Stacy spoke to Dr Smiley.  Will add Flagy 500 mg po q 8 hours

## 2017-05-12 NOTE — PROGRESS NOTE ADULT - SUBJECTIVE AND OBJECTIVE BOX
27M s/p Right achilles tendon repair 4/5/17 with uncomplicated postop course, was doing PT. c/o 3 day Hx of malodorous drainage from distal aspect of incision, with some pus noted. Denies numbness, tingling, fevers, chills. Ambulating with cam boot. Had not taken any antibiotics pre-hospital admission. Last food/drink yogurt @ 8am. (09 May 2017 15:04)05/10Procedure:  Irrigation and debridement of ankle  05/10/2017  R Achilles tendon wound dehiscence  Active  RRABINOVICH.  Pt placed on Vanco and Cefepime pre-op.OR culture grew E.Coli with good sensitivity to Cipro which I started today at 500 mgm po q.24 h.Discussed with  who described deep suppurative ,necrotic and foul smelling finding involving the soft tissue and tendon.Added Flagyl 500 mgm po q.8 h to cover anaerobes.  Good post op course with normal vital signs and afebrile.Labs normal except for slight elevation of creatine and decrease of EGFR as follows:Basic Metabolic Panel in AM (05.12.17 @ 06:50)    Creatinine, Serum: 1.36 mg/dL    eGFR if African American: 83 mL/min/1.73M2 (05.12.17 @ 06:50)    Right ankle in cast.Dry

## 2017-05-12 NOTE — PROGRESS NOTE ADULT - SUBJECTIVE AND OBJECTIVE BOX
S: Patient seen and examined. Pt. doing well, Pain endorsed but controlled. No acute events overnight.    AF VSS,    Motor: Wiggling toes  Sensation: SILT distally  Pulses: WWP, DP/PT 2+    Dressing: C/D/I. Incision/Achilles wound holding together; no drainage or dehiscence                          15.8   7.5   )-----------( 174      ( 11 May 2017 05:47 )             43.5             A/P:  27y Male s/p  I&D Achilles wound dehiscence   , POD#   2  -Stable  - Following cxs (Gram neg rods - prelim), continuing vanc and cefepime until cxs finals and sensitivies return  -Pain Control  -PT/NWB  -DVT ppx:SQH  -Advance diet as tolerated  -f/u AM labs  -Dispo: Home    Fran Hernandez MD  Ortho Resident

## 2017-05-13 VITALS
TEMPERATURE: 98 F | OXYGEN SATURATION: 99 % | SYSTOLIC BLOOD PRESSURE: 123 MMHG | DIASTOLIC BLOOD PRESSURE: 78 MMHG | RESPIRATION RATE: 17 BRPM | HEART RATE: 63 BPM

## 2017-05-13 LAB
ANION GAP SERPL CALC-SCNC: 6 MMOL/L — LOW (ref 9–16)
BUN SERPL-MCNC: 14 MG/DL — SIGNIFICANT CHANGE UP (ref 7–23)
CALCIUM SERPL-MCNC: 8.4 MG/DL — LOW (ref 8.5–10.5)
CHLORIDE SERPL-SCNC: 104 MMOL/L — SIGNIFICANT CHANGE UP (ref 96–108)
CO2 SERPL-SCNC: 28 MMOL/L — SIGNIFICANT CHANGE UP (ref 22–31)
CREAT SERPL-MCNC: 1.26 MG/DL — SIGNIFICANT CHANGE UP (ref 0.5–1.3)
GLUCOSE SERPL-MCNC: 87 MG/DL — SIGNIFICANT CHANGE UP (ref 70–99)
HCT VFR BLD CALC: 41.8 % — SIGNIFICANT CHANGE UP (ref 39–50)
HGB BLD-MCNC: 14.9 G/DL — SIGNIFICANT CHANGE UP (ref 13–17)
INR BLD: 0.97 — SIGNIFICANT CHANGE UP (ref 0.88–1.16)
MCHC RBC-ENTMCNC: 28.9 PG — SIGNIFICANT CHANGE UP (ref 27–34)
MCHC RBC-ENTMCNC: 35.6 G/DL — SIGNIFICANT CHANGE UP (ref 32–36)
MCV RBC AUTO: 81 FL — SIGNIFICANT CHANGE UP (ref 80–100)
PLATELET # BLD AUTO: 150 K/UL — SIGNIFICANT CHANGE UP (ref 150–400)
POTASSIUM SERPL-MCNC: 4.1 MMOL/L — SIGNIFICANT CHANGE UP (ref 3.5–5.3)
POTASSIUM SERPL-SCNC: 4.1 MMOL/L — SIGNIFICANT CHANGE UP (ref 3.5–5.3)
PROTHROM AB SERPL-ACNC: 10.8 SEC — SIGNIFICANT CHANGE UP (ref 9.8–12.7)
RBC # BLD: 5.16 M/UL — SIGNIFICANT CHANGE UP (ref 4.2–5.8)
RBC # FLD: 12.7 % — SIGNIFICANT CHANGE UP (ref 10.3–16.9)
SODIUM SERPL-SCNC: 138 MMOL/L — SIGNIFICANT CHANGE UP (ref 135–145)
WBC # BLD: 4.3 K/UL — SIGNIFICANT CHANGE UP (ref 3.8–10.5)
WBC # FLD AUTO: 4.3 K/UL — SIGNIFICANT CHANGE UP (ref 3.8–10.5)

## 2017-05-13 PROCEDURE — 85025 COMPLETE CBC W/AUTO DIFF WBC: CPT

## 2017-05-13 PROCEDURE — 80053 COMPREHEN METABOLIC PANEL: CPT

## 2017-05-13 PROCEDURE — 99285 EMERGENCY DEPT VISIT HI MDM: CPT | Mod: 25

## 2017-05-13 PROCEDURE — 86850 RBC ANTIBODY SCREEN: CPT

## 2017-05-13 PROCEDURE — 85730 THROMBOPLASTIN TIME PARTIAL: CPT

## 2017-05-13 PROCEDURE — 87102 FUNGUS ISOLATION CULTURE: CPT

## 2017-05-13 PROCEDURE — 87040 BLOOD CULTURE FOR BACTERIA: CPT

## 2017-05-13 PROCEDURE — 86900 BLOOD TYPING SEROLOGIC ABO: CPT

## 2017-05-13 PROCEDURE — 85652 RBC SED RATE AUTOMATED: CPT

## 2017-05-13 PROCEDURE — 87075 CULTR BACTERIA EXCEPT BLOOD: CPT

## 2017-05-13 PROCEDURE — 97161 PT EVAL LOW COMPLEX 20 MIN: CPT

## 2017-05-13 PROCEDURE — 85610 PROTHROMBIN TIME: CPT

## 2017-05-13 PROCEDURE — 86140 C-REACTIVE PROTEIN: CPT

## 2017-05-13 PROCEDURE — 87070 CULTURE OTHR SPECIMN AEROBIC: CPT

## 2017-05-13 PROCEDURE — 87186 SC STD MICRODIL/AGAR DIL: CPT

## 2017-05-13 PROCEDURE — 86901 BLOOD TYPING SEROLOGIC RH(D): CPT

## 2017-05-13 PROCEDURE — 85027 COMPLETE CBC AUTOMATED: CPT

## 2017-05-13 PROCEDURE — 80048 BASIC METABOLIC PNL TOTAL CA: CPT

## 2017-05-13 PROCEDURE — 36415 COLL VENOUS BLD VENIPUNCTURE: CPT

## 2017-05-13 RX ORDER — SACCHAROMYCES BOULARDII 250 MG
1 POWDER IN PACKET (EA) ORAL
Qty: 84 | Refills: 0 | OUTPATIENT
Start: 2017-05-13 | End: 2017-06-24

## 2017-05-13 RX ADMIN — HEPARIN SODIUM 5000 UNIT(S): 5000 INJECTION INTRAVENOUS; SUBCUTANEOUS at 06:06

## 2017-05-13 RX ADMIN — Medication 500 MILLIGRAM(S): at 06:06

## 2017-05-13 RX ADMIN — Medication 100 MILLIGRAM(S): at 06:06

## 2017-05-13 RX ADMIN — Medication 5 MILLIGRAM(S): at 06:06

## 2017-05-13 NOTE — PROGRESS NOTE ADULT - ASSESSMENT
27M s/p R achilles I&D  -Pain Control  -PT NWB RLE  -DVT PPX HSQ  -Dispo Planning Home today  -Cont Cipro/Flagyl x 4 weeks

## 2017-05-13 NOTE — PROGRESS NOTE ADULT - PROVIDER SPECIALTY LIST ADULT
Infectious Disease
Orthopedics

## 2017-05-14 LAB
CULTURE RESULTS: SIGNIFICANT CHANGE UP
CULTURE RESULTS: SIGNIFICANT CHANGE UP
SPECIMEN SOURCE: SIGNIFICANT CHANGE UP
SPECIMEN SOURCE: SIGNIFICANT CHANGE UP

## 2017-05-15 DIAGNOSIS — T81.31XA DISRUPTION OF EXTERNAL OPERATION (SURGICAL) WOUND, NOT ELSEWHERE CLASSIFIED, INITIAL ENCOUNTER: ICD-10-CM

## 2017-05-15 DIAGNOSIS — M25.571 PAIN IN RIGHT ANKLE AND JOINTS OF RIGHT FOOT: ICD-10-CM

## 2017-05-15 DIAGNOSIS — T81.4XXA INFECTION FOLLOWING A PROCEDURE, INITIAL ENCOUNTER: ICD-10-CM
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
## 2017-06-30 ENCOUNTER — INPATIENT (INPATIENT)
Facility: HOSPITAL | Age: 27
LOS: 2 days | Discharge: HOME HEALTH | DRG: 857 | End: 2017-07-03
Attending: ORTHOPAEDIC SURGERY | Admitting: ORTHOPAEDIC SURGERY
Payer: COMMERCIAL

## 2017-06-30 VITALS
OXYGEN SATURATION: 97 % | WEIGHT: 169.98 LBS | HEART RATE: 88 BPM | TEMPERATURE: 98 F | SYSTOLIC BLOOD PRESSURE: 105 MMHG | DIASTOLIC BLOOD PRESSURE: 63 MMHG | RESPIRATION RATE: 18 BRPM

## 2017-06-30 DIAGNOSIS — S86.001A UNSPECIFIED INJURY OF RIGHT ACHILLES TENDON, INITIAL ENCOUNTER: Chronic | ICD-10-CM

## 2017-06-30 LAB
APTT BLD: 32.8 SEC — SIGNIFICANT CHANGE UP (ref 27.5–37.4)
BASOPHILS NFR BLD AUTO: 1 % — SIGNIFICANT CHANGE UP (ref 0–2)
EOSINOPHIL NFR BLD AUTO: 2 % — SIGNIFICANT CHANGE UP (ref 0–6)
HCT VFR BLD CALC: 43.5 % — SIGNIFICANT CHANGE UP (ref 39–50)
HGB BLD-MCNC: 15.1 G/DL — SIGNIFICANT CHANGE UP (ref 13–17)
INR BLD: 1.1 — SIGNIFICANT CHANGE UP (ref 0.88–1.16)
LYMPHOCYTES # BLD AUTO: 28 % — SIGNIFICANT CHANGE UP (ref 13–44)
MCHC RBC-ENTMCNC: 27.6 PG — SIGNIFICANT CHANGE UP (ref 27–34)
MCHC RBC-ENTMCNC: 34.7 G/DL — SIGNIFICANT CHANGE UP (ref 32–36)
MCV RBC AUTO: 79.5 FL — LOW (ref 80–100)
MONOCYTES NFR BLD AUTO: 13.2 % — SIGNIFICANT CHANGE UP (ref 2–14)
NEUTROPHILS NFR BLD AUTO: 55.8 % — SIGNIFICANT CHANGE UP (ref 43–77)
PLATELET # BLD AUTO: 180 K/UL — SIGNIFICANT CHANGE UP (ref 150–400)
PROTHROM AB SERPL-ACNC: 12.2 SEC — SIGNIFICANT CHANGE UP (ref 9.8–12.7)
RBC # BLD: 5.47 M/UL — SIGNIFICANT CHANGE UP (ref 4.2–5.8)
RBC # FLD: 13 % — SIGNIFICANT CHANGE UP (ref 10.3–16.9)
WBC # BLD: 5.1 K/UL — SIGNIFICANT CHANGE UP (ref 3.8–10.5)
WBC # FLD AUTO: 5.1 K/UL — SIGNIFICANT CHANGE UP (ref 3.8–10.5)

## 2017-06-30 PROCEDURE — 99284 EMERGENCY DEPT VISIT MOD MDM: CPT | Mod: 25

## 2017-06-30 NOTE — ED PROVIDER NOTE - MEDICAL DECISION MAKING DETAILS
pt with right posterior achilles infection s/p achilles tendon wound repair 4/5 - po antibiotics as outpt but now requires washout. sent to ED for pre-op for tomorrow OR intervention

## 2017-06-30 NOTE — H&P ADULT - HISTORY OF PRESENT ILLNESS
28 y/o M presents due to dehiscence and drainage of right posterior foot post right achilles tendon repair on April 2017. After sx on the 5th of April, as per patient, he developed an infection w drainage and was then admitted for sx management on 5/9-5/13.  Patient states was then prescribed w antibiotics for about 1 month and has now been off antibiotics for a few days while following up with Dr Kumar in office. Patient continues to have wound-related issues and presents for further management. Pt states no hx of fever or chills.

## 2017-06-30 NOTE — ED PROVIDER NOTE - MUSCULOSKELETAL, MLM
right lower ext - (+) swelling, 3cm area of fluctuance posteriorly, foot swelling, no distal nv deficit, FROM at knee and hip

## 2017-06-30 NOTE — ED ADULT NURSE NOTE - OBJECTIVE STATEMENT
Pt CO Pain 5/10 to Right Heel.  Pt states "I had Tendon Sx on April 5, then a debridement Sx with Abx in May, and for the past two weeks I've been off the Cipro and Flaggyl but the wound isn't closing."  Pt ambulating with steady gait.  Pt denies N/V/D, SOB, Fevers at this time.  No other PMHx per pt.

## 2017-06-30 NOTE — H&P ADULT - NSHPPHYSICALEXAM_GEN_ALL_CORE
Right foot/ankle  - Able to range toes  - Pain w/ ROM of ankle at wound site  - Moderate swelling w/ fluctuance posteriorly, foot swelling  - No distal NV deficits  - Able to range knee/hip/contralateral ankle

## 2017-06-30 NOTE — ED PROVIDER NOTE - OBJECTIVE STATEMENT
28 y/o m presents for pre-op for washout of right posterior foot s/p infection following right achilles tendon repair 4/5.  Pt had initial surgery 4/5 and subsequently had infection and was admitted from 5/9-5/13.  Pt prescribed cipro and flagyl x 1 month.  Off antibiotics x 2 weeks.  Plan for OR tomorrow.  No fever or chills.

## 2017-07-01 ENCOUNTER — RESULT REVIEW (OUTPATIENT)
Age: 27
End: 2017-07-01

## 2017-07-01 LAB
ALBUMIN SERPL ELPH-MCNC: 4 G/DL — SIGNIFICANT CHANGE UP (ref 3.3–5)
ALP SERPL-CCNC: 76 U/L — SIGNIFICANT CHANGE UP (ref 40–120)
ALT FLD-CCNC: 46 U/L — HIGH (ref 10–45)
ANION GAP SERPL CALC-SCNC: 16 MMOL/L — SIGNIFICANT CHANGE UP (ref 5–17)
AST SERPL-CCNC: 30 U/L — SIGNIFICANT CHANGE UP (ref 10–40)
BILIRUB SERPL-MCNC: 0.3 MG/DL — SIGNIFICANT CHANGE UP (ref 0.2–1.2)
BLD GP AB SCN SERPL QL: NEGATIVE — SIGNIFICANT CHANGE UP
BUN SERPL-MCNC: 10 MG/DL — SIGNIFICANT CHANGE UP (ref 7–23)
CALCIUM SERPL-MCNC: 8.8 MG/DL — SIGNIFICANT CHANGE UP (ref 8.4–10.5)
CHLORIDE SERPL-SCNC: 99 MMOL/L — SIGNIFICANT CHANGE UP (ref 96–108)
CO2 SERPL-SCNC: 22 MMOL/L — SIGNIFICANT CHANGE UP (ref 22–31)
CREAT SERPL-MCNC: 1.2 MG/DL — SIGNIFICANT CHANGE UP (ref 0.5–1.3)
GLUCOSE SERPL-MCNC: 107 MG/DL — HIGH (ref 70–99)
NIGHT BLUE STAIN TISS: SIGNIFICANT CHANGE UP
POTASSIUM SERPL-MCNC: 3.2 MMOL/L — LOW (ref 3.5–5.3)
POTASSIUM SERPL-SCNC: 3.2 MMOL/L — LOW (ref 3.5–5.3)
PROT SERPL-MCNC: 7 G/DL — SIGNIFICANT CHANGE UP (ref 6–8.3)
RH IG SCN BLD-IMP: POSITIVE — SIGNIFICANT CHANGE UP
SODIUM SERPL-SCNC: 137 MMOL/L — SIGNIFICANT CHANGE UP (ref 135–145)
SPECIMEN SOURCE: SIGNIFICANT CHANGE UP

## 2017-07-01 RX ORDER — VANCOMYCIN HCL 1 G
1000 VIAL (EA) INTRAVENOUS EVERY 12 HOURS
Qty: 0 | Refills: 0 | Status: COMPLETED | OUTPATIENT
Start: 2017-07-01 | End: 2017-07-01

## 2017-07-01 RX ORDER — VANCOMYCIN HCL 1 G
1000 VIAL (EA) INTRAVENOUS EVERY 12 HOURS
Qty: 0 | Refills: 0 | Status: DISCONTINUED | OUTPATIENT
Start: 2017-07-01 | End: 2017-07-02

## 2017-07-01 RX ORDER — AMPICILLIN SODIUM AND SULBACTAM SODIUM 250; 125 MG/ML; MG/ML
1.5 INJECTION, POWDER, FOR SUSPENSION INTRAMUSCULAR; INTRAVENOUS EVERY 6 HOURS
Qty: 0 | Refills: 0 | Status: DISCONTINUED | OUTPATIENT
Start: 2017-07-01 | End: 2017-07-01

## 2017-07-01 RX ORDER — OXYCODONE HYDROCHLORIDE 5 MG/1
5 TABLET ORAL EVERY 4 HOURS
Qty: 0 | Refills: 0 | Status: DISCONTINUED | OUTPATIENT
Start: 2017-06-30 | End: 2017-07-03

## 2017-07-01 RX ORDER — ACETAMINOPHEN 500 MG
650 TABLET ORAL EVERY 6 HOURS
Qty: 0 | Refills: 0 | Status: DISCONTINUED | OUTPATIENT
Start: 2017-06-30 | End: 2017-07-03

## 2017-07-01 RX ORDER — MAGNESIUM HYDROXIDE 400 MG/1
30 TABLET, CHEWABLE ORAL
Qty: 0 | Refills: 0 | Status: DISCONTINUED | OUTPATIENT
Start: 2017-06-30 | End: 2017-07-03

## 2017-07-01 RX ORDER — SODIUM CHLORIDE 9 MG/ML
1000 INJECTION, SOLUTION INTRAVENOUS
Qty: 0 | Refills: 0 | Status: DISCONTINUED | OUTPATIENT
Start: 2017-06-30 | End: 2017-07-03

## 2017-07-01 RX ORDER — PIPERACILLIN AND TAZOBACTAM 4; .5 G/20ML; G/20ML
3.38 INJECTION, POWDER, LYOPHILIZED, FOR SOLUTION INTRAVENOUS ONCE
Qty: 0 | Refills: 0 | Status: COMPLETED | OUTPATIENT
Start: 2017-07-01 | End: 2017-07-01

## 2017-07-01 RX ORDER — OXYCODONE HYDROCHLORIDE 5 MG/1
10 TABLET ORAL EVERY 4 HOURS
Qty: 0 | Refills: 0 | Status: DISCONTINUED | OUTPATIENT
Start: 2017-06-30 | End: 2017-07-03

## 2017-07-01 RX ORDER — MORPHINE SULFATE 50 MG/1
2 CAPSULE, EXTENDED RELEASE ORAL EVERY 4 HOURS
Qty: 0 | Refills: 0 | Status: DISCONTINUED | OUTPATIENT
Start: 2017-06-30 | End: 2017-07-03

## 2017-07-01 RX ORDER — HEPARIN SODIUM 5000 [USP'U]/ML
5000 INJECTION INTRAVENOUS; SUBCUTANEOUS EVERY 8 HOURS
Qty: 0 | Refills: 0 | Status: DISCONTINUED | OUTPATIENT
Start: 2017-07-01 | End: 2017-07-03

## 2017-07-01 RX ORDER — DOCUSATE SODIUM 100 MG
100 CAPSULE ORAL THREE TIMES A DAY
Qty: 0 | Refills: 0 | Status: DISCONTINUED | OUTPATIENT
Start: 2017-06-30 | End: 2017-07-03

## 2017-07-01 RX ORDER — PIPERACILLIN AND TAZOBACTAM 4; .5 G/20ML; G/20ML
3.38 INJECTION, POWDER, LYOPHILIZED, FOR SOLUTION INTRAVENOUS EVERY 6 HOURS
Qty: 0 | Refills: 0 | Status: DISCONTINUED | OUTPATIENT
Start: 2017-07-01 | End: 2017-07-03

## 2017-07-01 RX ADMIN — PIPERACILLIN AND TAZOBACTAM 200 GRAM(S): 4; .5 INJECTION, POWDER, LYOPHILIZED, FOR SOLUTION INTRAVENOUS at 17:40

## 2017-07-01 RX ADMIN — AMPICILLIN SODIUM AND SULBACTAM SODIUM 100 GRAM(S): 250; 125 INJECTION, POWDER, FOR SUSPENSION INTRAMUSCULAR; INTRAVENOUS at 06:18

## 2017-07-01 RX ADMIN — PIPERACILLIN AND TAZOBACTAM 200 GRAM(S): 4; .5 INJECTION, POWDER, LYOPHILIZED, FOR SOLUTION INTRAVENOUS at 11:40

## 2017-07-01 RX ADMIN — Medication 100 MILLIGRAM(S): at 14:52

## 2017-07-01 RX ADMIN — Medication 250 MILLIGRAM(S): at 17:40

## 2017-07-01 RX ADMIN — Medication 100 MILLIGRAM(S): at 21:49

## 2017-07-01 RX ADMIN — SODIUM CHLORIDE 80 MILLILITER(S): 9 INJECTION, SOLUTION INTRAVENOUS at 17:40

## 2017-07-01 RX ADMIN — HEPARIN SODIUM 5000 UNIT(S): 5000 INJECTION INTRAVENOUS; SUBCUTANEOUS at 14:52

## 2017-07-01 RX ADMIN — Medication 250 MILLIGRAM(S): at 05:13

## 2017-07-01 RX ADMIN — HEPARIN SODIUM 5000 UNIT(S): 5000 INJECTION INTRAVENOUS; SUBCUTANEOUS at 21:48

## 2017-07-02 ENCOUNTER — TRANSCRIPTION ENCOUNTER (OUTPATIENT)
Age: 27
End: 2017-07-02

## 2017-07-02 LAB
ANION GAP SERPL CALC-SCNC: 10 MMOL/L — SIGNIFICANT CHANGE UP (ref 5–17)
BUN SERPL-MCNC: 16 MG/DL — SIGNIFICANT CHANGE UP (ref 7–23)
CALCIUM SERPL-MCNC: 8.4 MG/DL — SIGNIFICANT CHANGE UP (ref 8.4–10.5)
CHLORIDE SERPL-SCNC: 101 MMOL/L — SIGNIFICANT CHANGE UP (ref 96–108)
CO2 SERPL-SCNC: 27 MMOL/L — SIGNIFICANT CHANGE UP (ref 22–31)
CREAT SERPL-MCNC: 1.5 MG/DL — HIGH (ref 0.5–1.3)
GLUCOSE SERPL-MCNC: 90 MG/DL — SIGNIFICANT CHANGE UP (ref 70–99)
GRAM STN FLD: SIGNIFICANT CHANGE UP
GRAM STN FLD: SIGNIFICANT CHANGE UP
HCT VFR BLD CALC: 42.2 % — SIGNIFICANT CHANGE UP (ref 39–50)
HGB BLD-MCNC: 14.9 G/DL — SIGNIFICANT CHANGE UP (ref 13–17)
MCHC RBC-ENTMCNC: 28.9 PG — SIGNIFICANT CHANGE UP (ref 27–34)
MCHC RBC-ENTMCNC: 35.3 G/DL — SIGNIFICANT CHANGE UP (ref 32–36)
MCV RBC AUTO: 81.9 FL — SIGNIFICANT CHANGE UP (ref 80–100)
PLATELET # BLD AUTO: 153 K/UL — SIGNIFICANT CHANGE UP (ref 150–400)
POTASSIUM SERPL-MCNC: 4.2 MMOL/L — SIGNIFICANT CHANGE UP (ref 3.5–5.3)
POTASSIUM SERPL-SCNC: 4.2 MMOL/L — SIGNIFICANT CHANGE UP (ref 3.5–5.3)
RBC # BLD: 5.15 M/UL — SIGNIFICANT CHANGE UP (ref 4.2–5.8)
RBC # FLD: 13.3 % — SIGNIFICANT CHANGE UP (ref 10.3–16.9)
SODIUM SERPL-SCNC: 138 MMOL/L — SIGNIFICANT CHANGE UP (ref 135–145)
SPECIMEN SOURCE: SIGNIFICANT CHANGE UP
SPECIMEN SOURCE: SIGNIFICANT CHANGE UP
VANCOMYCIN TROUGH SERPL-MCNC: 11.3 UG/ML — SIGNIFICANT CHANGE UP (ref 10–20)
WBC # BLD: 4.4 K/UL — SIGNIFICANT CHANGE UP (ref 3.8–10.5)
WBC # FLD AUTO: 4.4 K/UL — SIGNIFICANT CHANGE UP (ref 3.8–10.5)

## 2017-07-02 RX ORDER — LINEZOLID 600 MG/300ML
600 INJECTION, SOLUTION INTRAVENOUS EVERY 12 HOURS
Qty: 0 | Refills: 0 | Status: DISCONTINUED | OUTPATIENT
Start: 2017-07-03 | End: 2017-07-03

## 2017-07-02 RX ORDER — LINEZOLID 600 MG/300ML
600 INJECTION, SOLUTION INTRAVENOUS ONCE
Qty: 0 | Refills: 0 | Status: COMPLETED | OUTPATIENT
Start: 2017-07-02 | End: 2017-07-02

## 2017-07-02 RX ADMIN — LINEZOLID 300 MILLIGRAM(S): 600 INJECTION, SOLUTION INTRAVENOUS at 21:45

## 2017-07-02 RX ADMIN — Medication 250 MILLIGRAM(S): at 06:07

## 2017-07-02 RX ADMIN — Medication 250 MILLIGRAM(S): at 17:22

## 2017-07-02 RX ADMIN — Medication 650 MILLIGRAM(S): at 21:46

## 2017-07-02 RX ADMIN — Medication 650 MILLIGRAM(S): at 23:00

## 2017-07-02 RX ADMIN — PIPERACILLIN AND TAZOBACTAM 200 GRAM(S): 4; .5 INJECTION, POWDER, LYOPHILIZED, FOR SOLUTION INTRAVENOUS at 11:52

## 2017-07-02 RX ADMIN — PIPERACILLIN AND TAZOBACTAM 200 GRAM(S): 4; .5 INJECTION, POWDER, LYOPHILIZED, FOR SOLUTION INTRAVENOUS at 00:06

## 2017-07-02 RX ADMIN — PIPERACILLIN AND TAZOBACTAM 200 GRAM(S): 4; .5 INJECTION, POWDER, LYOPHILIZED, FOR SOLUTION INTRAVENOUS at 06:06

## 2017-07-02 RX ADMIN — HEPARIN SODIUM 5000 UNIT(S): 5000 INJECTION INTRAVENOUS; SUBCUTANEOUS at 06:06

## 2017-07-02 RX ADMIN — HEPARIN SODIUM 5000 UNIT(S): 5000 INJECTION INTRAVENOUS; SUBCUTANEOUS at 15:07

## 2017-07-02 RX ADMIN — Medication 100 MILLIGRAM(S): at 15:07

## 2017-07-02 RX ADMIN — HEPARIN SODIUM 5000 UNIT(S): 5000 INJECTION INTRAVENOUS; SUBCUTANEOUS at 21:46

## 2017-07-02 RX ADMIN — Medication 100 MILLIGRAM(S): at 06:07

## 2017-07-02 RX ADMIN — PIPERACILLIN AND TAZOBACTAM 200 GRAM(S): 4; .5 INJECTION, POWDER, LYOPHILIZED, FOR SOLUTION INTRAVENOUS at 17:22

## 2017-07-02 NOTE — PROGRESS NOTE ADULT - ASSESSMENT
27M s/p washout and debridement of nonhealing achilles wound  -continue VAC  -elevate RLE  -OR cultures pending  -cont Abx  -will need home VAC ordered  -VAC change Monday

## 2017-07-02 NOTE — DISCHARGE NOTE ADULT - HOSPITAL COURSE
Admitted  Surgery  [place]-op Antibiotics  Pain control  DVT prophylaxis  OOB w/ precautions Admitted  Plastic and ID consult  Surgery - R achilles I and D 7/1/17  [place]-op Antibiotics  Pain control  DVT prophylaxis  OOB w/ precautions

## 2017-07-02 NOTE — DISCHARGE NOTE ADULT - ADDITIONAL INSTRUCTIONS
No strenuous activity, heavy lifting, driving, tub bathing, or returning to work until cleared by MD.  You may shower--keep dressing dry and protect tape covering wound sponge.  Remove dressing after post op day 7, or when told by your surgeon, then leave incision open to air.  Follow up with your doctor to schedule an appointment within 10-14 days. Call as soon as possible.   If you don't have a bowel movement by post op day 3, then take Milk of Magnesia (over the counter).  If no bowel movement by at least post op day 5, then use a Dulcolax suppository (over the counter) and/or a Fleets enema--if still no bowel movement, call your MD.  Contact your doctor if you experience: fever greater than 101.5, chills, chest pain, difficulty breathing, bleeding, redness or heat around the incision. No strenuous activity, heavy lifting, driving, tub bathing, or returning to work until cleared by MD.  You may sponge bathe.  You are weight bearing as tolerated.  You will be getting wound vac changes every Monday, Wednesday, and Friday.  You should see Dr Hopper in his office this Friday and he will do a vac change in the office.  Follow up with Dr Kumar in his office in 2 weeks from surgery.     If you don't have a bowel movement by post op day 3, then take Milk of Magnesia (over the counter).  If no bowel movement by at least post op day 5, then use a Dulcolax suppository (over the counter) and/or a Fleets enema--if still no bowel movement, call your MD.  Contact your doctor if you experience: fever greater than 101.5, chills, chest pain, difficulty breathing, bleeding, redness or heat around the incision.    Please take the full course of antibiotics. No strenuous activity, heavy lifting, driving, tub bathing, or returning to work until cleared by MD.  You may sponge bathe.  You are weight bearing as tolerated.  You will be getting wound vac changes every Monday, Wednesday, and Friday.  You should see Dr Hopper in his office this Friday and he will do a vac change in the office.  Follow up with Dr Kumar in his office in 2 weeks from surgery.     Follow up with Dr. Bonner (infectious disease) if any issues with your antibiotics.  If you don't have a bowel movement by post op day 3, then take Milk of Magnesia (over the counter).  If no bowel movement by at least post op day 5, then use a Dulcolax suppository (over the counter) and/or a Fleets enema--if still no bowel movement, call your MD.  Contact your doctor if you experience: fever greater than 101.5, chills, chest pain, difficulty breathing, bleeding, redness or heat around the incision.    Please take the full course of antibiotics. No strenuous activity, heavy lifting, driving, tub bathing, or returning to work until cleared by MD.  You may sponge bathe.  You are weight bearing as tolerated.  You will be getting wound vac changes every Monday, Wednesday, and Friday.  You should see Dr Hopper in his office this Friday and he will do a vac change in the office.  Follow up with Dr Kumar in his office in 2 weeks from surgery.     Follow up with Dr. Bonner (infectious disease) if any issues with your antibiotics.  If you don't have a bowel movement by post op day 3, then take Milk of Magnesia (over the counter).  If no bowel movement by at least post op day 5, then use a Dulcolax suppository (over the counter) and/or a Fleets enema--if still no bowel movement, call your MD.  Contact your doctor if you experience: fever greater than 101.5, chills, chest pain, difficulty breathing, bleeding, redness or heat around the incision.    Please take the full course of antibiotics.    Please follow up with your primary care provider to get your labs rechecked to ensure that your kidney function has continued to normalize.  Please be sure to hydrate well through out the day.

## 2017-07-02 NOTE — DISCHARGE NOTE ADULT - CARE PROVIDER_API CALL
Tino Hopper), Plastic Surgery; Surgery  [address] [place] [address]  Phone: [phone]  Fax: [phone]    Maxime Kumar), Orthopaedic Surgery  [address] [id number]  Phone: [phone]  Fax: [phone] Tino Hopper), Plastic Surgery; Surgery  [address] [place] [address]  Phone: [phone]  Fax: [phone]    Maxime Kumar), Orthopaedic Surgery  [address] [place] [address]  Phone: [phone]  Fax: [phone]    Blanche Bonner), Infectious Disease; Internal Medicine  [address] [address]  Phone: [phone]  Fax: [phone]

## 2017-07-02 NOTE — PROGRESS NOTE ADULT - SUBJECTIVE AND OBJECTIVE BOX
pt seen and examined this AM. s/p washout and debridement of right achilles wound. No complaints this AM.    Vital Signs Last 24 Hrs  T(C): 36.3 (02 Jul 2017 06:09), Max: 36.9 (01 Jul 2017 14:16)  T(F): 97.4 (02 Jul 2017 06:09), Max: 98.5 (01 Jul 2017 14:16)  HR: 57 (02 Jul 2017 06:09) (52 - 94)  BP: 108/71 (02 Jul 2017 06:09) (101/48 - 117/61)  BP(mean): --  RR: 17 (02 Jul 2017 06:09) (16 - 18)  SpO2: 100% (02 Jul 2017 06:09) (95% - 100%)  I&O's Detail    01 Jul 2017 07:01  -  02 Jul 2017 07:00  --------------------------------------------------------  IN:    IV PiggyBack: 800 mL    lactated ringers.: 1520 mL    Oral Fluid: 600 mL  Total IN: 2920 mL    OUT:    VAC (Vacuum Assisted Closure) System: 25 mL    Voided: 3500 mL  Total OUT: 3525 mL    Total NET: -605 mL      02 Jul 2017 07:01  -  02 Jul 2017 08:07  --------------------------------------------------------  IN:    lactated ringers.: 80 mL  Total IN: 80 mL    OUT:  Total OUT: 0 mL    Total NET: 80 mL                            14.9   4.4   )-----------( 153      ( 02 Jul 2017 06:03 )             42.2     Gen - NAD, comfortable  VAC in place to RLE, no leak

## 2017-07-02 NOTE — DISCHARGE NOTE ADULT - CARE PLAN
Principal Discharge DX:	Post-operative infection  Goal:	Recovery  Instructions for follow-up, activity and diet:	See below Principal Discharge DX:	Post-operative infection  Goal:	improvement after surgery  Instructions for follow-up, activity and diet:	See below

## 2017-07-02 NOTE — PROGRESS NOTE ADULT - SUBJECTIVE AND OBJECTIVE BOX
No events overnight.    Vital Signs Last 24 Hrs  T(C): 36.3 (02 Jul 2017 06:09), Max: 36.9 (01 Jul 2017 14:16)  T(F): 97.4 (02 Jul 2017 06:09), Max: 98.5 (01 Jul 2017 14:16)  HR: 57 (02 Jul 2017 06:09) (52 - 94)  BP: 108/71 (02 Jul 2017 06:09) (101/48 - 117/61)  BP(mean): --  RR: 17 (02 Jul 2017 06:09) (16 - 18)  SpO2: 100% (02 Jul 2017 06:09) (95% - 100%)    AFVSS    PE: wound VAC w/ ACE bandage dressing CDI  5/5 EHL/FHL  WWP Toes  SILT Toes

## 2017-07-02 NOTE — DISCHARGE NOTE ADULT - MEDICATION SUMMARY - MEDICATIONS TO TAKE
I will START or STAY ON the medications listed below when I get home from the hospital:    Percocet 5/325 oral tablet  -- 1 tab(s) by mouth every 4 hours, As Needed for acute pain MDD:6  -- Caution federal law prohibits the transfer of this drug to any person other  than the person for whom it was prescribed.  May cause drowsiness.  Alcohol may intensify this effect.  Use care when operating dangerous machinery.  This prescription cannot be refilled.  This product contains acetaminophen.  Do not use  with any other product containing acetaminophen to prevent possible liver damage.  Using more of this medication than prescribed may cause serious breathing problems.    -- Indication: For Pain    Augmentin 875 mg-125 mg oral tablet  -- 1 tab(s) by mouth every 12 hours for 6 weeks total  -- Finish all this medication unless otherwise directed by prescriber.  Take with food or milk.    -- Indication: For Infection    Florastor 250 mg oral capsule  -- 1 cap(s) by mouth 2 times a day MDD:2  -- Indication: For Home med

## 2017-07-02 NOTE — PROGRESS NOTE ADULT - ASSESSMENT
27M s/p R achilles I&D  -Pain Control  -PT NWB RLE  -DVT PPX HSQ  -Dispo Planning Home tomorrow w/ VAC management at home  - Zosyn and Vancomycin

## 2017-07-02 NOTE — DISCHARGE NOTE ADULT - MEDICATION SUMMARY - MEDICATIONS TO STOP TAKING
I will STOP taking the medications listed below when I get home from the hospital:    Cipro 500 mg oral tablet  -- 1 tab(s) by mouth every 12 hours MDD:2  -- Avoid prolonged or excessive exposure to direct and/or artificial sunlight while taking this medication.  Check with your doctor before becoming pregnant.  Do not take dairy products, antacids, or iron preparations within one hour of this medication.  Finish all this medication unless otherwise directed by prescriber.  Medication should be taken with plenty of water.    Flagyl 500 mg oral tablet  -- 1 tab(s) by mouth every 8 hours MDD:3  -- Do not drink alcoholic beverages when taking this medication.  Finish all this medication unless otherwise directed by prescriber.  May discolor urine or feces.

## 2017-07-03 VITALS
OXYGEN SATURATION: 100 % | TEMPERATURE: 98 F | SYSTOLIC BLOOD PRESSURE: 112 MMHG | RESPIRATION RATE: 18 BRPM | HEART RATE: 61 BPM | DIASTOLIC BLOOD PRESSURE: 70 MMHG

## 2017-07-03 DIAGNOSIS — T81.4XXA INFECTION FOLLOWING A PROCEDURE, INITIAL ENCOUNTER: ICD-10-CM

## 2017-07-03 LAB
-  AMPICILLIN: SIGNIFICANT CHANGE UP
-  AMPICILLIN: SIGNIFICANT CHANGE UP
-  LINEZOLID: SIGNIFICANT CHANGE UP
-  LINEZOLID: SIGNIFICANT CHANGE UP
ANION GAP SERPL CALC-SCNC: 9 MMOL/L — SIGNIFICANT CHANGE UP (ref 5–17)
BUN SERPL-MCNC: 9 MG/DL — SIGNIFICANT CHANGE UP (ref 7–23)
CALCIUM SERPL-MCNC: 8.7 MG/DL — SIGNIFICANT CHANGE UP (ref 8.4–10.5)
CHLORIDE SERPL-SCNC: 102 MMOL/L — SIGNIFICANT CHANGE UP (ref 96–108)
CO2 SERPL-SCNC: 27 MMOL/L — SIGNIFICANT CHANGE UP (ref 22–31)
CREAT SERPL-MCNC: 1.4 MG/DL — HIGH (ref 0.5–1.3)
CRP SERPL-MCNC: 0.1 MG/DL — SIGNIFICANT CHANGE UP (ref 0–0.4)
CRP SERPL-MCNC: 0.1 MG/DL — SIGNIFICANT CHANGE UP (ref 0–0.4)
CULTURE RESULTS: SIGNIFICANT CHANGE UP
CULTURE RESULTS: SIGNIFICANT CHANGE UP
ERYTHROCYTE [SEDIMENTATION RATE] IN BLOOD: 2 MM/HR — SIGNIFICANT CHANGE UP
ERYTHROCYTE [SEDIMENTATION RATE] IN BLOOD: 2 MM/HR — SIGNIFICANT CHANGE UP
GLUCOSE SERPL-MCNC: 124 MG/DL — HIGH (ref 70–99)
HCT VFR BLD CALC: 42.4 % — SIGNIFICANT CHANGE UP (ref 39–50)
HGB BLD-MCNC: 14.9 G/DL — SIGNIFICANT CHANGE UP (ref 13–17)
MCHC RBC-ENTMCNC: 28.5 PG — SIGNIFICANT CHANGE UP (ref 27–34)
MCHC RBC-ENTMCNC: 35.1 G/DL — SIGNIFICANT CHANGE UP (ref 32–36)
MCV RBC AUTO: 81.1 FL — SIGNIFICANT CHANGE UP (ref 80–100)
METHOD TYPE: SIGNIFICANT CHANGE UP
METHOD TYPE: SIGNIFICANT CHANGE UP
ORGANISM # SPEC MICROSCOPIC CNT: SIGNIFICANT CHANGE UP
PLATELET # BLD AUTO: 155 K/UL — SIGNIFICANT CHANGE UP (ref 150–400)
POTASSIUM SERPL-MCNC: 3.8 MMOL/L — SIGNIFICANT CHANGE UP (ref 3.5–5.3)
POTASSIUM SERPL-SCNC: 3.8 MMOL/L — SIGNIFICANT CHANGE UP (ref 3.5–5.3)
RBC # BLD: 5.23 M/UL — SIGNIFICANT CHANGE UP (ref 4.2–5.8)
RBC # FLD: 13.1 % — SIGNIFICANT CHANGE UP (ref 10.3–16.9)
SODIUM SERPL-SCNC: 138 MMOL/L — SIGNIFICANT CHANGE UP (ref 135–145)
SPECIMEN SOURCE: SIGNIFICANT CHANGE UP
SPECIMEN SOURCE: SIGNIFICANT CHANGE UP
WBC # BLD: 3.6 K/UL — LOW (ref 3.8–10.5)
WBC # FLD AUTO: 3.6 K/UL — LOW (ref 3.8–10.5)

## 2017-07-03 RX ORDER — SODIUM CHLORIDE 9 MG/ML
1000 INJECTION INTRAMUSCULAR; INTRAVENOUS; SUBCUTANEOUS
Qty: 0 | Refills: 0 | Status: DISCONTINUED | OUTPATIENT
Start: 2017-07-03 | End: 2017-07-03

## 2017-07-03 RX ADMIN — Medication 1 TABLET(S): at 17:57

## 2017-07-03 RX ADMIN — PIPERACILLIN AND TAZOBACTAM 200 GRAM(S): 4; .5 INJECTION, POWDER, LYOPHILIZED, FOR SOLUTION INTRAVENOUS at 06:10

## 2017-07-03 RX ADMIN — PIPERACILLIN AND TAZOBACTAM 200 GRAM(S): 4; .5 INJECTION, POWDER, LYOPHILIZED, FOR SOLUTION INTRAVENOUS at 11:12

## 2017-07-03 RX ADMIN — HEPARIN SODIUM 5000 UNIT(S): 5000 INJECTION INTRAVENOUS; SUBCUTANEOUS at 13:16

## 2017-07-03 RX ADMIN — HEPARIN SODIUM 5000 UNIT(S): 5000 INJECTION INTRAVENOUS; SUBCUTANEOUS at 06:09

## 2017-07-03 RX ADMIN — OXYCODONE HYDROCHLORIDE 10 MILLIGRAM(S): 5 TABLET ORAL at 06:41

## 2017-07-03 RX ADMIN — LINEZOLID 600 MILLIGRAM(S): 600 INJECTION, SOLUTION INTRAVENOUS at 09:50

## 2017-07-03 RX ADMIN — PIPERACILLIN AND TAZOBACTAM 200 GRAM(S): 4; .5 INJECTION, POWDER, LYOPHILIZED, FOR SOLUTION INTRAVENOUS at 00:19

## 2017-07-03 RX ADMIN — OXYCODONE HYDROCHLORIDE 10 MILLIGRAM(S): 5 TABLET ORAL at 06:09

## 2017-07-03 NOTE — CONSULT NOTE ADULT - PROBLEM SELECTOR RECOMMENDATION 9
1) E faecalis in wound culture sensitive to Ampicillin; d/c Vancomycin, Zosyn and Zyvox and discharge home on Augmentin 875mg bid x 30 days  2) f/u with Pl surgery and Ortho; ID f/u as needed

## 2017-07-03 NOTE — PROGRESS NOTE ADULT - ASSESSMENT
27M s/p R achilles I&D  - VAC change today by plastics as per Dr Hopper  -Pain Control  -PT NWB RLE  -DVT PPX HSQ  -Dispo Planning Home tomorrow w/ VAC management at home  - Zosyn and Vancomycin

## 2017-07-03 NOTE — PROGRESS NOTE ADULT - SUBJECTIVE AND OBJECTIVE BOX
No events overnight.    Vital Signs Last 24 Hrs  T(C): 35.7 (03 Jul 2017 04:47), Max: 36.7 (02 Jul 2017 17:48)  T(F): 96.2 (03 Jul 2017 04:47), Max: 98.1 (02 Jul 2017 17:48)  HR: 63 (03 Jul 2017 04:47) (55 - 65)  BP: 99/63 (03 Jul 2017 04:47) (99/63 - 114/71)  BP(mean): --  RR: 16 (03 Jul 2017 04:47) (16 - 17)  SpO2: 97% (03 Jul 2017 04:47) (97% - 100%)    AFVSS    PE: wound VAC w/ ACE bandage dressing CDI   5/5 EHL/FHL  WWP Toes  SILT Toes

## 2017-07-03 NOTE — PROGRESS NOTE ADULT - SUBJECTIVE AND OBJECTIVE BOX
SUBJECTIVE:  Doing well.   No overnight events.     OBJECTIVE:     ** VITAL SIGNS / I&O's **    T(C): 35.7 (07-03-17 @ 04:47), Max: 36.7 (07-02-17 @ 17:48)  T(F): 96.2 (07-03-17 @ 04:47), Max: 98.1 (07-02-17 @ 17:48)  HR: 63 (07-03-17 @ 04:47) (55 - 65)  BP: 99/63 (07-03-17 @ 04:47) (99/63 - 114/71)  RR: 16 (07-03-17 @ 04:47) (16 - 17)  SpO2: 97% (07-03-17 @ 04:47) (97% - 100%)      02 Jul 2017 07:01  -  03 Jul 2017 07:00  --------------------------------------------------------  IN:    IV PiggyBack: 950 mL    lactated ringers.: 320 mL    Oral Fluid: 1660 mL  Total IN: 2930 mL    OUT:    VAC (Vacuum Assisted Closure) System: 20 mL    Voided: 2860 mL  Total OUT: 2880 mL    Total NET: 50 mL          ** PHYSICAL EXAM **    -- CONSTITUTIONAL: AOx3. NAD.   -- CARDIOVASCULAR: Regular rate and rhythm. S1, S2.  -- RESPIRATORY: Bilateral breath sounds.   -- RIGHT LOWER EXTREMITY: VAC taken down. Wound shallow. Approximately 2cm tall and 1cm wide. (+) Granulation tissue throughout except at center which demonstrates exposed tendon. No foreign bodies appreciated. No drainage or active bleeding appreciated. VAC reapplied.    ** LABS **              CAPILLARY BLOOD GLUCOSE

## 2017-07-03 NOTE — CONSULT NOTE ADULT - ASSESSMENT
28 y/o Male presents due to dehiscence and drainage of right posterior foot post right achilles tendon, h/o  repair on April 2017, revision on 5/13/2017 with E coli in wound culture treated with Ciprofloxacin for 1 month, now s/p debridement with E. faecalis in wound culture

## 2017-07-03 NOTE — CONSULT NOTE ADULT - SUBJECTIVE AND OBJECTIVE BOX
HPI:  26 y/o M presents due to dehiscence and drainage of right posterior foot post right achilles tendon repair on April 2017. After sx on the 5th of April, as per patient, he developed an infection w drainage and was then admitted for sx management on 5/9-5/13.  Patient states was then prescribed w antibiotics for about 1 month and has now been off antibiotics for a few days while following up with Dr Kumar in office. Patient continues to have wound-related issues and presents for further management. Pt states no hx of fever or chills. (30 Jun 2017 23:48)      PAST MEDICAL & SURGICAL HISTORY:  No pertinent past medical history  Injury of Achilles tendon, right, initial encounter: Right achilles tendon repair        REVIEW OF SYSTEMS:    General: no weakness; no fevers, no chills  Skin/Breast: no rash  Respiratory and Thorax: no SOB, no cough  Cardiovascular:	No chest pain  Gastrointestinal:	 no nausea, vomiting , diarrhea  Genitourinary:	no dysuria, no difficulty urinating, no hematuria  Musculoskeletal:no weakness, no joint swelling/pain  Neurological:no focal weakness/numbness  Endocrine:no polyuria, no polydipsia      ANTIBIOTICS:  amoxicillin  875 milliGRAM(s)/clavulanate 1 Tablet(s) Oral every 12 hours    MEDICATIONS  (PRN):  acetaminophen   Tablet 650 milliGRAM(s) Oral every 6 hours PRN For Temp greater than 38 C (100.4 F)  acetaminophen   Tablet. 650 milliGRAM(s) Oral every 6 hours PRN Mild Pain (1 - 3)  oxyCODONE IR 10 milliGRAM(s) Oral every 4 hours PRN Severe Pain (7 - 10)  oxyCODONE IR 5 milliGRAM(s) Oral every 4 hours PRN Moderate Pain (4 - 6)  morphine  - Injectable 2 milliGRAM(s) IV Push every 4 hours PRN Breakthrough pain  magnesium hydroxide Suspension 30 milliLiter(s) Oral two times a day PRN Constipation      Allergies:No Known Allergies      SOCIAL HISTORY: no smoking no ETOH    FAMILY HISTORY:  No pertinent family history in first degree relatives      Vital Signs Last 24 Hrs  T(C): 36.5 (03 Jul 2017 12:57), Max: 36.7 (02 Jul 2017 17:48)  T(F): 97.7 (03 Jul 2017 12:57), Max: 98.1 (02 Jul 2017 17:48)  HR: 56 (03 Jul 2017 12:57) (55 - 65)  BP: 126/77 (03 Jul 2017 12:57) (99/63 - 126/77)  BP(mean): --  RR: 20 (03 Jul 2017 12:57) (16 - 20)  SpO2: 100% (03 Jul 2017 12:57) (97% - 100%)    PHYSICAL EXAM:  Constitutional:Well-developed, well nourished  Eyes:SOLITARIO, EOMI  Ear/Nose/Throat: no oral lesion, no sinus tenderness on percussion	  Neck:no JVD, no lymphadenopathy, supple  Respiratory: CTA luigi  Cardiovascular: S1S2 RRR, no murmurs  Gastrointestinal:soft, (+) BS, no HSM  Extremities: Incission 2x 3 cmm, tendon exposed, now vac dressing  Vascular: DP Pulse:	right normal; left normal            LABS:                        14.9   3.6   )-----------( 155      ( 03 Jul 2017 10:48 )             42.4     07-03    138  |  102  |  9   ----------------------------<  124<H>  3.8   |  27  |  1.40<H>    Ca    8.7      03 Jul 2017 10:48    Sedimentation Rate, Erythrocyte (07.03.17 @ 10:48)    Sedimentation Rate, Erythrocyte: 2 mm/Hr    C-Reactive Protein, Serum (07.03.17 @ 10:48)    C-Reactive Protein, Serum: 0.10 mg/dL        MICROBIOLOGY:  Culture - Surgical Swab (07.01.17 @ 13:18)    -  Ampicillin: S <=2    Gram Stain:   Rare Gram positive cocci in pairs  Few White blood cells    -  Linezolid: S 2    Specimen Source: .Surgical Swab #1 right achilles    Culture Results:   Few Enterococcus faecalis    Organism Identification: Enterococcus faecalis    Organism: Enterococcus faecalis    Method Type: EMIGDIO      RADIOLOGY & ADDITIONAL STUDIES:

## 2017-07-03 NOTE — PROGRESS NOTE ADULT - ASSESSMENT
27M s/p washout and debridement of Right ankle with application of VAC. POD 2.  >> VAC changes MWF  >> Wrap RLE with ACE wrap starting from base of toes and continuing to the mid-calf  >> RLE elevation  >> Continue antibiotics as per ID  >> Dispo planning from PRS point of view  >> Care per primary team

## 2017-07-05 PROCEDURE — 80048 BASIC METABOLIC PNL TOTAL CA: CPT

## 2017-07-05 PROCEDURE — 85730 THROMBOPLASTIN TIME PARTIAL: CPT

## 2017-07-05 PROCEDURE — 85610 PROTHROMBIN TIME: CPT

## 2017-07-05 PROCEDURE — 86850 RBC ANTIBODY SCREEN: CPT

## 2017-07-05 PROCEDURE — 99285 EMERGENCY DEPT VISIT HI MDM: CPT

## 2017-07-05 PROCEDURE — 80053 COMPREHEN METABOLIC PANEL: CPT

## 2017-07-05 PROCEDURE — 87070 CULTURE OTHR SPECIMN AEROBIC: CPT

## 2017-07-05 PROCEDURE — 85027 COMPLETE CBC AUTOMATED: CPT

## 2017-07-05 PROCEDURE — 87116 MYCOBACTERIA CULTURE: CPT

## 2017-07-05 PROCEDURE — 87186 SC STD MICRODIL/AGAR DIL: CPT

## 2017-07-05 PROCEDURE — 86901 BLOOD TYPING SEROLOGIC RH(D): CPT

## 2017-07-05 PROCEDURE — 85025 COMPLETE CBC W/AUTO DIFF WBC: CPT

## 2017-07-05 PROCEDURE — 87075 CULTR BACTERIA EXCEPT BLOOD: CPT

## 2017-07-05 PROCEDURE — 88304 TISSUE EXAM BY PATHOLOGIST: CPT

## 2017-07-05 PROCEDURE — 85652 RBC SED RATE AUTOMATED: CPT

## 2017-07-05 PROCEDURE — 36415 COLL VENOUS BLD VENIPUNCTURE: CPT

## 2017-07-05 PROCEDURE — 87206 SMEAR FLUORESCENT/ACID STAI: CPT

## 2017-07-05 PROCEDURE — 80202 ASSAY OF VANCOMYCIN: CPT

## 2017-07-05 PROCEDURE — 86900 BLOOD TYPING SEROLOGIC ABO: CPT

## 2017-07-05 PROCEDURE — 86140 C-REACTIVE PROTEIN: CPT

## 2017-07-07 DIAGNOSIS — T81.4XXA INFECTION FOLLOWING A PROCEDURE, INITIAL ENCOUNTER: ICD-10-CM

## 2017-07-07 DIAGNOSIS — B95.2 ENTEROCOCCUS AS THE CAUSE OF DISEASES CLASSIFIED ELSEWHERE: ICD-10-CM
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
## 2017-07-07 DIAGNOSIS — T81.31XA DISRUPTION OF EXTERNAL OPERATION (SURGICAL) WOUND, NOT ELSEWHERE CLASSIFIED, INITIAL ENCOUNTER: ICD-10-CM

## 2017-07-07 LAB — SURGICAL PATHOLOGY STUDY: SIGNIFICANT CHANGE UP

## 2017-07-20 ENCOUNTER — EMERGENCY (EMERGENCY)
Facility: HOSPITAL | Age: 27
LOS: 1 days | Discharge: HOME | End: 2017-07-20
Attending: EMERGENCY MEDICINE | Admitting: EMERGENCY MEDICINE
Payer: COMMERCIAL

## 2017-07-20 VITALS
DIASTOLIC BLOOD PRESSURE: 83 MMHG | TEMPERATURE: 98 F | SYSTOLIC BLOOD PRESSURE: 131 MMHG | HEART RATE: 76 BPM | RESPIRATION RATE: 17 BRPM | OXYGEN SATURATION: 97 %

## 2017-07-20 VITALS — HEIGHT: 68 IN | WEIGHT: 169.98 LBS

## 2017-07-20 DIAGNOSIS — Z79.891 LONG TERM (CURRENT) USE OF OPIATE ANALGESIC: ICD-10-CM

## 2017-07-20 DIAGNOSIS — S86.001A UNSPECIFIED INJURY OF RIGHT ACHILLES TENDON, INITIAL ENCOUNTER: ICD-10-CM

## 2017-07-20 DIAGNOSIS — Z79.2 LONG TERM (CURRENT) USE OF ANTIBIOTICS: ICD-10-CM

## 2017-07-20 DIAGNOSIS — S86.001A UNSPECIFIED INJURY OF RIGHT ACHILLES TENDON, INITIAL ENCOUNTER: Chronic | ICD-10-CM

## 2017-07-20 DIAGNOSIS — Z79.899 OTHER LONG TERM (CURRENT) DRUG THERAPY: ICD-10-CM

## 2017-07-20 DIAGNOSIS — M25.571 PAIN IN RIGHT ANKLE AND JOINTS OF RIGHT FOOT: ICD-10-CM

## 2017-07-20 PROCEDURE — 99283 EMERGENCY DEPT VISIT LOW MDM: CPT

## 2017-07-20 NOTE — ED PROVIDER NOTE - PROGRESS NOTE DETAILS
wound debrided and closed by Dr Hopper, given return precautions, will follow up with Dr Hopper next tuesday

## 2017-07-20 NOTE — ED PROVIDER NOTE - OBJECTIVE STATEMENT
26 yo M w/ no pertinent PMHx presents to ED for wound check to right achilles. Pt reports right achilles rupture repair 4/5/2017, Pt returned to ED 5/9/17 for possible infection and 6/3/17. pt with wound vac placed 6/30/17; pt notes wound vac stopped working. no bleeding, no chills, no fever, no pain, no purulent drainage.

## 2017-07-20 NOTE — ED PROVIDER NOTE - MEDICAL DECISION MAKING DETAILS
Pt with open wound over right achilles with exposed tendon. no sign of infection. pt already on antibiotics and has pain medication at home; will consult plastics regarding case. Pt with open wound over right achilles with exposed tendon. no sign of infection. pt already on antibiotics and has pain medication at home; will consult plastics regarding wound

## 2017-08-23 LAB
CULTURE RESULTS: SIGNIFICANT CHANGE UP
SPECIMEN SOURCE: SIGNIFICANT CHANGE UP

## 2019-03-28 NOTE — PATIENT PROFILE ADULT. - NS PRO CONTRA FLU 1
Information: Selecting Yes will display possible errors in your note based on the variables you have selected. This validation is only offered as a suggestion for you. PLEASE NOTE THAT THE VALIDATION TEXT WILL BE REMOVED WHEN YOU FINALIZE YOUR NOTE. IF YOU WANT TO FAX A PRELIMINARY NOTE YOU WILL NEED TO TOGGLE THIS TO 'NO' IF YOU DO NOT WANT IT IN YOUR FAXED NOTE. out of season (available sept 1 thru apr 2 only)

## 2020-06-09 NOTE — PATIENT PROFILE ADULT. - TEACHING/LEARNING FACTORS INFLUENCE READINESS TO LEARN OTHER
Patient chart reviewed, last seen by Dr. Livingston 2/24/20, to return to clinic in 6 months, appointment scheduled for 8/25/20, per last note 3/13/20 patient may continue using Advair 250/50, last refill on 3/16/20 with 2 refills, medication refilled per request.        none

## 2021-11-05 NOTE — PROGRESS NOTE ADULT - MINUTES
90 Discussed risks and benefits of anesthesia including but not limited to the risk of sore throat, N/V, damage to mouth, teeth and lips, stroke, MI and even death.  Patient demonstrates understanding, all questions answered. The patient wishes to proceed with planned treatment.

## 2021-12-23 NOTE — PATIENT PROFILE ADULT. - AS SC BRADEN NUTRITION
Oral Chemotherapy Monitoring Program     Placed call to Slava Lane in follow up of Venclexta/Brukinsa oral chemotherapy.     Left a message requesting a call back. No drug names were mentioned in the voicemail. Will update when response is received.      Prema Cesar, PharmD, BCACP  Oral Chemotherapy Monitoring Program  [facility]  [phone]  December 23, 2021   (4) excellent

## 2021-12-30 NOTE — BRIEF OPERATIVE NOTE - POST-OP DX
64-year-old male, accompanied by his staff from the group [employer].      He has been having significant pain in his left thigh for the past 6+ months.  He works as a  in a restaurant which involves prolonged standing.  He is allowed to sit periodically.  He finds when he sits in a chair he does not have the option of stretching.  Over the last several weeks he was having significant pain.  TPI held for few days.    He has evaluation including CAT scan which showed some sclerosis of the left pubic bone.  He does not have any urinary symptoms and was evaluated by a urologist.  He denies any numbness or tingling.  On a scale of 0-10 he rates his pain at its worst, when he is sitting he does not have any.  Transferring and standing makes it significant.  He has difficulty transferring.     He has significant obesity.  He also smokes 1 pack/day and is considering quitting if it will help with pain reduction. He is not currently drinking as he has no access to a bar nearby.     Past Medical History:   Diagnosis Date     Borderline mental retardation 2/20/2013     Chronic infection     MRSA     Coagulation disorder (H)     on coumadin     COPD (chronic obstructive pulmonary disease) (H)      History of DVT of lower extremity      History of pulmonary embolism      Hyperlipidemia      Mild intellectual disabilities      Morbid obesity (H)      NEL (obstructive sleep apnea)      Peripheral edema      Peripheral vascular disease (H)      Sleep apnea     uses CPAP     Thrombosis      Tobacco dependence      Uncomplicated asthma      Venous stasis dermatitis      Current Outpatient Medications   Medication Sig Dispense Refill     acetaminophen (TYLENOL) 325 MG tablet Take 1-2 tablets (325-650 mg) by mouth every 6 hours as needed for mild pain, fever or headaches 100 tablet 3     albuterol (ALBUTEROL) 108 (90 BASE) MCG/ACT inhaler Inhale 2 puffs into the lungs every 6 hours as needed 1 Inhaler 0     ARIPiprazole (ABILIFY)  "5 MG tablet Take 5 mg by mouth daily       bacitracin 500 UNIT/GM OINT        buPROPion (WELLBUTRIN SR) 150 MG 12 hr tablet Take 150 mg by mouth 2 times daily       Cadexomer Iodine, topical, 0.9% (IODOSORB) 0.9 % GEL gel Apply topically daily Apply to left foot wound daily after cleansing the wound and blotting it dry. 1 Tube 3     cholecalciferol 50 MCG (2000 UT) tablet Take 1 tablet by mouth daily       clindamycin (CLEOCIN) 300 MG capsule Take 1 capsule (300 mg) by mouth 3 times daily 30 capsule 0     cloNIDine (CATAPRES) 0.1 MG tablet Take 0.1 mg by mouth 2 times daily       clotrimazole (LOTRIMIN) 1 % external cream Apply topically 2 times daily 60 g 0     diclofenac (VOLTAREN) 1 % topical gel Apply 4 g topically 2 times daily as needed for moderate pain 100 g 1     diphenhydrAMINE (BENADRYL) 25 MG capsule Take 25 mg by mouth every 6 hours as needed for itching or allergies       Emollient (CERAVE) CREA Externally apply topically daily 453 g 11     EPINEPHrine (EPIPEN 2-BRE) 0.3 MG/0.3ML injection 2-pack INJECT 0.3MG INTRAMUSCULAR ONE TIME FOR ONE DOSE AS NEEDED FOR ANAPHYLAXIS (CALL 911 IF YOU HAVE TO GIVE) (FOR BEE STINGS) **LABEL EACH PEN* 2 mL 3     gabapentin (NEURONTIN) 100 MG capsule Take 400 mg by mouth 3 times daily At 0900, 1200, and 2000       Gauze Pads & Dressings (GAUZE PADS 4\"X4\") 4\"X4\" PADS 1 each 3 times daily as needed 25 each 1     loratadine (CLARITIN) 10 MG tablet Take 10 mg by mouth daily       LORazepam (ATIVAN) 1 MG tablet Take 1 mg by mouth every 6 hours as needed for anxiety       methocarbamol (ROBAXIN) 750 MG tablet Take 1 tablet (750 mg) by mouth 4 times daily as needed for muscle spasms 60 tablet 0     montelukast (SINGULAIR) 10 MG tablet TAKE 1 TABLET BY MOUTH EVERY MORNING (ASTHMA) 30 tablet 11     Multiple Vitamin (DAILY-JOVAN) TABS TAKE 1 TABLET BY MOUTH EVERY MORNING (VITAMINS) 30 tablet 11     naltrexone (DEPADE/REVIA) 50 MG tablet Take 50 mg by mouth daily       nicotine " (COMMIT) 2 MG lozenge        nicotine (NICODERM CQ) 21 MG/24HR 24 hr patch        nicotine (NICORETTE) 2 MG gum Take 2 mg by mouth as needed for smoking cessation       nystatin-triamcinolone (MYCOLOG II) 351011-9.1 UNIT/GM-% external cream Apply topically 2 times daily For 1-2 weeks.       omeprazole (PRILOSEC) 40 MG DR capsule Take 1 capsule (40 mg) by mouth daily 90 capsule 2     order for DME Equipment being ordered: roll of micropore tape to dress foot wound bid 1 each 0     order for DME Equipment being ordered: 1 surgical shoe 1 Device 0     order for DME [employer] Phone [phone] Fax [phone]  EdemaWear Size Medium/Yellow qty 4 sleeves  Length of Need: 1 month  Frequency of dressing change daily 1 Device 0     order for DME [employer] Stock[employer]  Phone #[phone]  Fax #[phone]  Coolflex Velcro wraps    Length of Need: Life Time  # of Pairs 1 set 30 days 0     order for DME [employer] Phone [phone] Fax [phone]  Primary Dressing Hydrofera Blue Ready   Qty 5 sheets  Secondary Dressing 4' roll gauze Qty 30  Secondary Dressing 2' medipore tape Qty 1  Secondary Dressing 4x4 gauze loaf Qty  1  Length of Need: 1 month  Frequency of dressing change: daily 30 days 0     order for DME Oxygen 2 Li/min  at night and bled into BiPAP 1 Device 0     order for DME Equipment being ordered: CPAP with mask and tubing 1 Device 0     order for DME Equipment being ordered: support compression hose BK  2 pair black 30mm HG   To be applied on arising & removed while lying down to go to sleep 1 each 0     polyethylene glycol (MIRALAX) 17 GM/Dose powder Take 17 g (1 capful) by mouth daily as needed for constipation 578 g 0     PULMICORT FLEXHALER 180 MCG/ACT inhaler INHALE 2 PUFFS INTO THE LUNGS TWICE DAILY. 1 each 11     SENNA-TABS 8.6 MG tablet        sertraline (ZOLOFT) 100 MG tablet Take 100 mg by mouth daily Take with 50 mg tablet for a total dose of 150 mg daily.        sertraline (ZOLOFT) 50 MG tablet        simvastatin (ZOCOR) 40 MG tablet TAKE 1 TABLET BY MOUTH EVERY MORNING.  (CHOLESTEROL) 30 tablet 11     SPIRIVA HANDIHALER 18 MCG inhaled capsule INHALE CONTENTS OF 1 CAPSULE INTO THE LUNGS ONCE DAILY 30 capsule 11     spironolactone (ALDACTONE) 25 MG tablet TAKE 1 TABLET BY MOUTH ONCE DAILY. (HIGH BLOOD PRESSURE) 30 tablet 11     temazepam (RESTORIL) 15 MG capsule Take 15 mg by mouth nightly as needed for sleep       tiZANidine (ZANAFLEX) 2 MG tablet Take 1 tablet (2 mg) by mouth 3 times daily 90 tablet 1     triamcinolone (KENALOG) 0.1 % external cream Apply to AA BID x 1-2 week then PRN only 80 g 2     trolamine salicylate (ASPERCREME) 10 % external cream Apply topically as needed for moderate pain knee       varenicline (CHANTIX BRE) 0.5 MG X 11 & 1 MG X 42 tablet Take 0.5 mg tab daily for 3 days, THEN 0.5 mg tab twice daily for 4 days, THEN 1 mg twice daily. 53 tablet 0     vitamin B complex with vitamin C (STRESS TAB) tablet Take 1 tablet by mouth daily 90 tablet 3     warfarin ANTICOAGULANT (COUMADIN) 4 MG tablet Take 1-1/2 tablets (6 mg) every Monday, Friday; and take 2 tablets (8 mg) all other days of the week. Next INR date is 9/28/21. 11 tablet 0     warfarin ANTICOAGULANT (COUMADIN) 4 MG tablet 6 mg mon,fri and 8 mg ROW  Recheck INR 2/22/21 54 tablet 0     warfarin ANTICOAGULANT (COUMADIN) 4 MG tablet Take 1 tablet (4 mg) by mouth daily 6 mg mon,fri and 8 mg row (Patient taking differently: Take 2 mg by mouth daily 2+6mg tues, wed, thurs, sat, sun) 44 tablet 0     Warfarin Therapy Reminder 1 each continuous prn 1 each 0     White Petrolatum ointment Apply topically nightly as needed for dry skin       /79   Pulse 71   Resp 16   SpO2 97%          On examination, the patient is alert and cooperative.  He walks with antalgic gait on the left side.  He could hardly take a few steps.  On the examination table, he was able to move his neck, upper extremities  without difficulty.  He was able to move his right lower extremity without difficulty.  There is no tenderness in the abdominal region or the groin region on the left side.  He is tender over the left adductor and the left anterior thigh medially.     Focal areas of tenderness was isolated in the affected muscles.     Impression: At this point, this 64-year-old male with myalgia and dystonia affecting the muscles of the left thigh, likely a result of his posture and his left ankle chronic ulceration which is also giving him some discomfort. 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     In terms of treatment, he has had trigger point injections. We will go with Botox for longer term relief as he has features of focal dystonia.     20 minutes spent for this visit, greater than 50% was for counseling on above-mentioned issues.     Procedure note: With his informed consent, after explaining the benefits and risks of the procedure, using alcohol for skin prep, 100 units of Botox in 2 cc of preservative-free normal saline were injected with EMG guidance 33 units into the left adductor,67 units to left medial hamstrings in 3 different areas, and he tolerated the procedure well. He was able to get up and walk around better and noticed more he was comfortable and able to move better.     I have suggested that he can use ice, continue Tylenol. He can return to work as a  and continue as per his usual routine.  I would like him to update my clinic in about 2 weeks as to how he is doing.  I cautioned him to avoid overdoing activities that would bring on his discomfort.  I would like to see him in follow-up for repeat Botox in 12 weeks time.     Yinka Hagen MD      Wound dehiscence  05/10/2017  R Achilles Tendon  Active  Fran Hernandez

## 2022-08-03 NOTE — DISCHARGE NOTE ADULT - NS DC ANGIO PCI YN
Please keep your wound clean and dry.  Wash gently with soap and water and apply antibiotic ointment (bacitracin, neosporin, etc.) over the wound after washing. Please watch for signs of infection including: increased\spreading redness, swelling, pus-like discharge, or a fever greater than 100.4F. If you experience any of these, please contact your Primary Care Doctor or Return to the Emergency Department for a wound check.     Please follow up with your Primary Care Doctor in 5-7 days for wound recheck and suture removal.  You may return to the Emergency Department if you are unable to see your Primary Care Doctor.  Please return to the ER for any new or worsening symptoms.    A healing laceration should not be exposed to direct sunlight because of the risk for hyperpigmentation (darkening of the skin). It is recommended that you use sunscreen on lacerations or abrasions to help prevent the development of hyperpigmentation once the wounds are healed.      no

## 2023-04-05 NOTE — ED ADULT NURSE NOTE - NS ED NOTE ABUSE RESPONSE YN
Yes Olumiant Pregnancy And Lactation Text: Based on animal studies, Olumiant may cause embryo-fetal harm when administered to pregnant women.  The medication should not be used in pregnancy.  Breastfeeding is not recommended during treatment.

## 2023-06-05 NOTE — ED ADULT NURSE NOTE - NS ED PATIENT SAFETY CONCERN
Discharge instructions reviewed with pt and family, handouts given, verbalized understanding with no further questions at this time. Dr. Mccormick spoke to pt at bedside, reviewed procedure and answered questions, MD telephone number provided per AVS sheet. No pain or nausea noted, tolerating po fluids without difficulty, no other complaints noted. Fall precautions reviewed, consents in chart, PIV to be removed at discharge.  
PT PREPPED FOR PROCEDURE  
Patient awake and alert in recovery. No distress noted.  
No

## 2023-12-13 NOTE — H&P ADULT - ASSESSMENT
Triage with Dr Gipson who accepts pt for admit to [facility] for alcohol detox. Update team   28yo w/ above findings  - Admit for abx  - Will consult plastic surgery for wound management recommendations   - Will consider ID consult as well pending plastic sx recs  - Discussed w/ Dr Kumar

## 2024-02-22 NOTE — BRIEF OPERATIVE NOTE - ESTIMATED BLOOD LOSS
Followed by Dr. Murcia   - - CRT-D implant for primary prevention   Biventricular pacing is likely needed in the future given long first degree av block and bifascicular block and then progression to third degree AV block  Shared decision making was utilized between the physician/provider and patient/family in regards to ICD generator implantation and therapy.    He agrees to proceed  NYHA class 2    Risks involve device implant include but are not limited to bleeding, infection, valvular damage, heart attack, stroke, lung collapse (pneumothorax or hemothorax), heart collapse (pericardial tamponade), heart perforation, kidney failure, death. Elective or emergency surgery may be required to repair some of these complications. Prolonged hospitalization would be required. General anesthesia may be required for the procedure.    Will be able to start toprol after biventricular ICD in place    CAD s/p CABG: recent [facility] 1/8/2024 with severe native mutlivessel disease, occluded vein graft to RCA, patent graft to Cx and patent LIMA to LAD.   - Continue Aspirin     AS: planned TAVR with Dr. Jerome. He confirmed with Dr Jerome TAVR after BIV ICD, may have complete av block with TAVR  His wife and daughter agreed as well as he       Future Appointments   Date Time Provider Department Center   4/22/2024 11:00 AM Aakash Murcia III, MD [facility]      Ernst Parmar M.D.   Electrophysiology/Cardiology   [facility] [place]   [address]                      [address]   [place] [address]                             [facility] [address] [phone]  ===========================================================    Subjective:     Aakash Faust, a 80 y.o. year-old who presents for evaluation for CRT-D implant.     He has ischemic cardiomyopathy and reports feeling fatigued. He has aortic stenosis with possible TAVR 
5

## 2025-04-29 NOTE — DISCHARGE NOTE ADULT - NS AS DC PROVIDER CONTACT Y/N MULTI
- has previously had infected pseudocyst   - attempted to obtain CT study, but patient refused  - monitor for signs of infection or labs indicating infection as patient has refused   - monitor    Yes